# Patient Record
Sex: MALE | Race: BLACK OR AFRICAN AMERICAN | NOT HISPANIC OR LATINO | Employment: FULL TIME | URBAN - METROPOLITAN AREA
[De-identification: names, ages, dates, MRNs, and addresses within clinical notes are randomized per-mention and may not be internally consistent; named-entity substitution may affect disease eponyms.]

---

## 2017-06-01 ENCOUNTER — OFFICE VISIT (OUTPATIENT)
Dept: FAMILY MEDICINE | Facility: CLINIC | Age: 47
End: 2017-06-01
Payer: COMMERCIAL

## 2017-06-01 VITALS
OXYGEN SATURATION: 97 % | DIASTOLIC BLOOD PRESSURE: 80 MMHG | RESPIRATION RATE: 16 BRPM | HEIGHT: 76 IN | WEIGHT: 239.19 LBS | SYSTOLIC BLOOD PRESSURE: 150 MMHG | HEART RATE: 85 BPM | TEMPERATURE: 98 F | BODY MASS INDEX: 29.13 KG/M2

## 2017-06-01 DIAGNOSIS — I10 HYPERTENSION, UNCONTROLLED: ICD-10-CM

## 2017-06-01 DIAGNOSIS — M10.9 GOUT, UNSPECIFIED CAUSE, UNSPECIFIED CHRONICITY, UNSPECIFIED SITE: ICD-10-CM

## 2017-06-01 DIAGNOSIS — K43.9 EPIGASTRIC HERNIA: Primary | ICD-10-CM

## 2017-06-01 PROCEDURE — 99999 PR PBB SHADOW E&M-EST. PATIENT-LVL III: CPT | Mod: PBBFAC,,, | Performed by: PHYSICIAN ASSISTANT

## 2017-06-01 PROCEDURE — 99214 OFFICE O/P EST MOD 30 MIN: CPT | Mod: S$GLB,,, | Performed by: PHYSICIAN ASSISTANT

## 2017-06-01 RX ORDER — ALLOPURINOL 300 MG/1
300 TABLET ORAL DAILY
Qty: 90 TABLET | Refills: 0 | Status: SHIPPED | OUTPATIENT
Start: 2017-06-01 | End: 2017-07-24 | Stop reason: SDUPTHER

## 2017-06-01 RX ORDER — LISINOPRIL 10 MG/1
10 TABLET ORAL DAILY
Qty: 30 TABLET | Refills: 0 | Status: SHIPPED | OUTPATIENT
Start: 2017-06-01 | End: 2017-07-24

## 2017-06-01 NOTE — PROGRESS NOTES
Subjective:       Patient ID: Parminder Gregorio is a 46 y.o. male.    Chief Complaint: Gout (medication refill allopurinol) and Hernia (pt would like to get second opinion)    HPI: 47 yo male presents for abdominal mass. Noticed over the past week when working out and doing crunches in the upper mid abdomen. Goes away after the exercise. No pain, no trouble with digestion. Normal BMs.     Social History     Social History    Marital status:      Spouse name: N/A    Number of children: N/A    Years of education: N/A     Occupational History    Not on file.     Social History Main Topics    Smoking status: Never Smoker    Smokeless tobacco: Not on file    Alcohol use Yes      Comment: Socially    Drug use: No    Sexual activity: Yes     Partners: Female     Birth control/ protection: None      Comment: 6/1/17  with same partner 27 years     Other Topics Concern    Not on file     Social History Narrative    No narrative on file       Review of Systems   Gastrointestinal: Negative for abdominal pain, constipation and diarrhea.        Abdominal mass       Objective:      Physical Exam   Constitutional: He is oriented to person, place, and time. He appears well-developed and well-nourished. No distress.   HENT:   Head: Normocephalic and atraumatic.   Cardiovascular: Normal rate and regular rhythm.    Pulmonary/Chest: Effort normal and breath sounds normal.   Abdominal: Soft. Normal appearance and bowel sounds are normal. There is no tenderness.       Neurological: He is alert and oriented to person, place, and time.   Skin: He is not diaphoretic.   Vitals reviewed.      Assessment:       1. Epigastric hernia    2. Hypertension, uncontrolled    3. Gout, unspecified cause, unspecified chronicity, unspecified site        Plan:         Parminder was seen today for gout and hernia.    Diagnoses and all orders for this visit:    Epigastric hernia  -  Advised US to assess, pt wishes to hold off at this time.  -   He will f/u with PCP in 1 month for follow up    Hypertension, uncontrolled  -     lisinopril 10 MG tablet; Take 1 tablet (10 mg total) by mouth once daily.  -     Pt has been out of medication for about 3 months, will resume medication and follow up with PCP in 1 month to assess efficacy    Gout, unspecified cause, unspecified chronicity, unspecified site  -     allopurinol (ZYLOPRIM) 300 MG tablet; Take 1 tablet (300 mg total) by mouth once daily.

## 2017-07-24 ENCOUNTER — OFFICE VISIT (OUTPATIENT)
Dept: FAMILY MEDICINE | Facility: CLINIC | Age: 47
End: 2017-07-24
Payer: COMMERCIAL

## 2017-07-24 ENCOUNTER — LAB VISIT (OUTPATIENT)
Dept: LAB | Facility: HOSPITAL | Age: 47
End: 2017-07-24
Attending: FAMILY MEDICINE
Payer: COMMERCIAL

## 2017-07-24 VITALS
WEIGHT: 235 LBS | DIASTOLIC BLOOD PRESSURE: 80 MMHG | HEIGHT: 76 IN | HEART RATE: 76 BPM | RESPIRATION RATE: 16 BRPM | TEMPERATURE: 99 F | OXYGEN SATURATION: 98 % | BODY MASS INDEX: 28.62 KG/M2 | SYSTOLIC BLOOD PRESSURE: 138 MMHG

## 2017-07-24 DIAGNOSIS — M1A.09X1 IDIOPATHIC CHRONIC GOUT OF MULTIPLE SITES WITH TOPHUS: Primary | ICD-10-CM

## 2017-07-24 DIAGNOSIS — R73.03 PREDIABETES: ICD-10-CM

## 2017-07-24 DIAGNOSIS — I10 HYPERTENSION, UNCONTROLLED: ICD-10-CM

## 2017-07-24 PROCEDURE — 36415 COLL VENOUS BLD VENIPUNCTURE: CPT | Mod: PO

## 2017-07-24 PROCEDURE — 99214 OFFICE O/P EST MOD 30 MIN: CPT | Mod: S$GLB,,, | Performed by: FAMILY MEDICINE

## 2017-07-24 PROCEDURE — 83036 HEMOGLOBIN GLYCOSYLATED A1C: CPT

## 2017-07-24 PROCEDURE — 99999 PR PBB SHADOW E&M-EST. PATIENT-LVL III: CPT | Mod: PBBFAC,,, | Performed by: FAMILY MEDICINE

## 2017-07-24 RX ORDER — ALLOPURINOL 300 MG/1
300 TABLET ORAL DAILY
Qty: 90 TABLET | Refills: 0 | Status: SHIPPED | OUTPATIENT
Start: 2017-07-24 | End: 2017-12-01 | Stop reason: SDUPTHER

## 2017-07-24 RX ORDER — LISINOPRIL 10 MG/1
10 TABLET ORAL DAILY
Qty: 90 TABLET | Refills: 0 | Status: SHIPPED | OUTPATIENT
Start: 2017-07-24 | End: 2019-08-22

## 2017-07-24 NOTE — PROGRESS NOTES
Subjective:       Patient ID: Parminder Gregorio is a 46 y.o. male.    Chief Complaint: Hypertension (follow up )    HPI    HTN F/u:    Adherence with meds? Yes - lately  Home BP range: unsure - some in 140s, none in 150s.   Side effects: No  Following a low salt diet ? No  Current exercise? Yes  Need of refills? Yes  Recent changes in blood pressure medication: No  Patient has been in pain or taking decongestants/anti-inflammatory/OCP/SSRI medication: no  Patient has other symptoms (headache, weakness,  blurry vision, chest pain, palpitations, etc): no    Gout - pt is doing well on allopurinol. He is adherent and is not experiencing any side effects.    Prediabetes - Pt has been exercising and reducing his carb intake. Due for a1c today.    Current Outpatient Prescriptions on File Prior to Visit   Medication Sig Dispense Refill    [DISCONTINUED] allopurinol (ZYLOPRIM) 300 MG tablet Take 1 tablet (300 mg total) by mouth once daily. 90 tablet 0    [DISCONTINUED] lisinopril 10 MG tablet Take 1 tablet (10 mg total) by mouth once daily. 30 tablet 0     No current facility-administered medications on file prior to visit.        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 He uses smokeless tobacco. He reports that he drinks alcohol. He reports that he does not use drugs.    Review of Systems  see hpi  Objective:     Vitals:    07/24/17 0910   BP: 138/80   Pulse: 76   Resp: 16   Temp: 98.6 °F (37 °C)        Physical Exam   Constitutional: He appears well-developed. No distress.   HENT:   Head: Normocephalic and atraumatic.   Eyes: Conjunctivae and EOM are normal.   Cardiovascular: Normal rate and regular rhythm.  Exam reveals no gallop and no  friction rub.    No murmur heard.  Pulmonary/Chest: Effort normal and breath sounds normal. No respiratory distress. He has no wheezes. He has no rales. He exhibits no tenderness.   Abdominal: Soft. He exhibits no distension.   Diastasis recti   Musculoskeletal: He exhibits no edema.   No gross extremity deformity   Neurological: He is alert.   Psychiatric: He has a normal mood and affect. His behavior is normal.   Nursing note and vitals reviewed.      Assessment:       1. Idiopathic chronic gout of multiple sites with tophus    2. Prediabetes    3. Hypertension, uncontrolled        Plan:       Parminder was seen today for hypertension.    Diagnoses and all orders for this visit:    Acute idiopathic gout of multiple sites  - Chronic - stable     Pt is doing well on current therapy and is requesting a refill. No side effects noted. Will continue current therapy.    -     allopurinol (ZYLOPRIM) 300 MG tablet; Take 1 tablet (300 mg total) by mouth once daily.    Prediabetes  -     Hemoglobin A1c; Future  Chronic - will check a1c today. Pt has been watching his starch intake.    Hypertension, uncontrolled  -     lisinopril 10 MG tablet; Take 1 tablet (10 mg total) by mouth once daily.    Advised pt to monitor their blood pressure and notify me if they have BPs repeatedly over 140/90, or if they have concerning sxs such as chest pain, lightheadedness or palpitations.          Return in about 3 months (around 10/24/2017) for Hypertension.        Pt verbalized understanding and agreed with our plan.

## 2017-07-25 PROBLEM — R73.03 PREDIABETES: Status: ACTIVE | Noted: 2017-07-25

## 2017-07-25 LAB
ESTIMATED AVG GLUCOSE: 134 MG/DL
HBA1C MFR BLD HPLC: 6.3 %

## 2017-07-26 ENCOUNTER — TELEPHONE (OUTPATIENT)
Dept: FAMILY MEDICINE | Facility: CLINIC | Age: 47
End: 2017-07-26

## 2017-07-26 NOTE — TELEPHONE ENCOUNTER
----- Message from Jeffrey Brown MD sent at 7/25/2017  2:05 PM CDT -----  Please notify patient results are abnormal as his prediabetes is worsening.  Please have him reduce his starch/carb intake and increase his exercise or he will require medication. If he has additional questions or concerns, please schedule a f/u.

## 2017-12-01 DIAGNOSIS — M1A.09X1 IDIOPATHIC CHRONIC GOUT OF MULTIPLE SITES WITH TOPHUS: ICD-10-CM

## 2017-12-01 RX ORDER — ALLOPURINOL 300 MG/1
300 TABLET ORAL DAILY
Qty: 90 TABLET | Refills: 3 | Status: SHIPPED | OUTPATIENT
Start: 2017-12-01 | End: 2019-01-14 | Stop reason: SDUPTHER

## 2019-01-14 DIAGNOSIS — M1A.09X1 IDIOPATHIC CHRONIC GOUT OF MULTIPLE SITES WITH TOPHUS: ICD-10-CM

## 2019-01-14 RX ORDER — ALLOPURINOL 300 MG/1
300 TABLET ORAL DAILY
Qty: 30 TABLET | Refills: 0 | Status: SHIPPED | OUTPATIENT
Start: 2019-01-14 | End: 2019-08-22

## 2019-01-14 NOTE — TELEPHONE ENCOUNTER
Please call patient and let them know that their medication was filled, but they are do for a follow up appointment. Please help them schedule.

## 2019-05-23 ENCOUNTER — TELEPHONE (OUTPATIENT)
Dept: FAMILY MEDICINE | Facility: CLINIC | Age: 49
End: 2019-05-23

## 2019-08-22 ENCOUNTER — OFFICE VISIT (OUTPATIENT)
Dept: FAMILY MEDICINE | Facility: CLINIC | Age: 49
End: 2019-08-22
Payer: COMMERCIAL

## 2019-08-22 VITALS
HEIGHT: 76 IN | HEART RATE: 104 BPM | DIASTOLIC BLOOD PRESSURE: 96 MMHG | OXYGEN SATURATION: 97 % | SYSTOLIC BLOOD PRESSURE: 154 MMHG | WEIGHT: 240.31 LBS | TEMPERATURE: 99 F | BODY MASS INDEX: 29.26 KG/M2

## 2019-08-22 DIAGNOSIS — I10 HYPERTENSION, UNCONTROLLED: ICD-10-CM

## 2019-08-22 DIAGNOSIS — B96.89 BACTERIAL SINUSITIS: Primary | ICD-10-CM

## 2019-08-22 DIAGNOSIS — J32.9 BACTERIAL SINUSITIS: Primary | ICD-10-CM

## 2019-08-22 PROCEDURE — 99999 PR PBB SHADOW E&M-EST. PATIENT-LVL III: ICD-10-PCS | Mod: PBBFAC,,, | Performed by: PHYSICIAN ASSISTANT

## 2019-08-22 PROCEDURE — 99214 PR OFFICE/OUTPT VISIT, EST, LEVL IV, 30-39 MIN: ICD-10-PCS | Mod: 25,S$GLB,, | Performed by: PHYSICIAN ASSISTANT

## 2019-08-22 PROCEDURE — 96372 THER/PROPH/DIAG INJ SC/IM: CPT | Mod: S$GLB,,, | Performed by: PHYSICIAN ASSISTANT

## 2019-08-22 PROCEDURE — 96372 PR INJECTION,THERAP/PROPH/DIAG2ST, IM OR SUBCUT: ICD-10-PCS | Mod: S$GLB,,, | Performed by: PHYSICIAN ASSISTANT

## 2019-08-22 PROCEDURE — 99999 PR PBB SHADOW E&M-EST. PATIENT-LVL III: CPT | Mod: PBBFAC,,, | Performed by: PHYSICIAN ASSISTANT

## 2019-08-22 PROCEDURE — 99214 OFFICE O/P EST MOD 30 MIN: CPT | Mod: 25,S$GLB,, | Performed by: PHYSICIAN ASSISTANT

## 2019-08-22 RX ORDER — LEVOFLOXACIN 500 MG/1
500 TABLET, FILM COATED ORAL DAILY
Qty: 5 TABLET | Refills: 0 | Status: SHIPPED | OUTPATIENT
Start: 2019-08-22 | End: 2019-08-27

## 2019-08-22 RX ORDER — FLUTICASONE PROPIONATE 50 MCG
1 SPRAY, SUSPENSION (ML) NASAL 2 TIMES DAILY
Qty: 16 G | Refills: 0 | Status: SHIPPED | OUTPATIENT
Start: 2019-08-22 | End: 2020-07-08

## 2019-08-22 RX ORDER — PROMETHAZINE HYDROCHLORIDE AND DEXTROMETHORPHAN HYDROBROMIDE 6.25; 15 MG/5ML; MG/5ML
5 SYRUP ORAL EVERY 8 HOURS PRN
Qty: 180 ML | Refills: 0 | Status: SHIPPED | OUTPATIENT
Start: 2019-08-22 | End: 2019-09-01

## 2019-08-22 NOTE — PROGRESS NOTES
Subjective:       Patient ID: Parminder Gregorio is a 48 y.o. male.    Chief Complaint: Sinus Problem and Headache    Sinus Problem   This is a new problem. The current episode started 1 to 4 weeks ago. The problem has been gradually worsening since onset. There has been no fever. Associated symptoms include congestion, coughing ( mucous), headaches and sinus pressure. Pertinent negatives include no ear pain or sore throat.     Social History     Socioeconomic History    Marital status:      Spouse name: Not on file    Number of children: Not on file    Years of education: Not on file    Highest education level: Not on file   Occupational History    Not on file   Social Needs    Financial resource strain: Not on file    Food insecurity:     Worry: Not on file     Inability: Not on file    Transportation needs:     Medical: Not on file     Non-medical: Not on file   Tobacco Use    Smoking status: Never Smoker    Smokeless tobacco: Current User   Substance and Sexual Activity    Alcohol use: Yes     Comment: Socially    Drug use: No    Sexual activity: Yes     Partners: Female     Birth control/protection: None     Comment: 6/1/17  with same partner 27 years   Lifestyle    Physical activity:     Days per week: Not on file     Minutes per session: Not on file    Stress: Not on file   Relationships    Social connections:     Talks on phone: Not on file     Gets together: Not on file     Attends [employer]oration[employer] service: Not on file     Active member of club or organization: Not on file     Attends meetings of clubs or organizations: Not on file     Relationship status: Not on file   Other Topics Concern    Not on file   Social History Narrative    Not on file       Review of Systems   Constitutional: Positive for fever.   HENT: Positive for congestion, postnasal drip and sinus pressure. Negative for ear pain and sore throat.    Respiratory: Positive for cough ( mucous).    Neurological: Positive for  headaches.       Objective:      Physical Exam   Constitutional: He is oriented to person, place, and time. He appears well-developed and well-nourished.   HENT:   Head: Normocephalic and atraumatic.   Right Ear: Tympanic membrane and ear canal normal.   Left Ear: Tympanic membrane and ear canal normal.   Nose: Mucosal edema and rhinorrhea present. Right sinus exhibits maxillary sinus tenderness. Left sinus exhibits maxillary sinus tenderness.   Mouth/Throat: No oropharyngeal exudate or posterior oropharyngeal edema.   Neurological: He is alert and oriented to person, place, and time.   Skin: Skin is warm and dry.   Psychiatric: He has a normal mood and affect. His behavior is normal.   Vitals reviewed.      Assessment:       1. Bacterial sinusitis    2. Hypertension, uncontrolled        Plan:         Parminder was seen today for sinus problem and headache.    Diagnoses and all orders for this visit:    Bacterial sinusitis  -     levoFLOXacin (LEVAQUIN) 500 MG tablet; Take 1 tablet (500 mg total) by mouth once daily. for 5 days  -     fluticasone propionate (FLONASE) 50 mcg/actuation nasal spray; 1 spray (50 mcg total) by Each Nostril route 2 (two) times daily.  -     methylPREDNISolone sod suc(PF) injection 125 mg  -     promethazine-dextromethorphan (PROMETHAZINE-DM) 6.25-15 mg/5 mL Syrp; Take 5 mLs by mouth every 8 (eight) hours as needed.    Hypertension, uncontrolled        -     Pt on no meds, states monitors regularly at home. Well controlled. Nurse visit in 1 week

## 2020-01-21 ENCOUNTER — OFFICE VISIT (OUTPATIENT)
Dept: URGENT CARE | Facility: CLINIC | Age: 50
End: 2020-01-21
Payer: COMMERCIAL

## 2020-01-21 VITALS
RESPIRATION RATE: 18 BRPM | HEART RATE: 91 BPM | OXYGEN SATURATION: 99 % | TEMPERATURE: 98 F | DIASTOLIC BLOOD PRESSURE: 89 MMHG | BODY MASS INDEX: 29.22 KG/M2 | SYSTOLIC BLOOD PRESSURE: 145 MMHG | HEIGHT: 76 IN | WEIGHT: 240 LBS

## 2020-01-21 DIAGNOSIS — L02.91 ABSCESS: Primary | ICD-10-CM

## 2020-01-21 PROCEDURE — 99214 PR OFFICE/OUTPT VISIT, EST, LEVL IV, 30-39 MIN: ICD-10-PCS | Mod: 25,S$GLB,, | Performed by: FAMILY MEDICINE

## 2020-01-21 PROCEDURE — 10060 I&D ABSCESS SIMPLE/SINGLE: CPT | Mod: S$GLB,,, | Performed by: FAMILY MEDICINE

## 2020-01-21 PROCEDURE — 99214 OFFICE O/P EST MOD 30 MIN: CPT | Mod: 25,S$GLB,, | Performed by: FAMILY MEDICINE

## 2020-01-21 PROCEDURE — 10060 INCISION & DRAINAGE: ICD-10-PCS | Mod: S$GLB,,, | Performed by: FAMILY MEDICINE

## 2020-01-21 RX ORDER — CLINDAMYCIN HYDROCHLORIDE 300 MG/1
300 CAPSULE ORAL 3 TIMES DAILY
Qty: 30 CAPSULE | Refills: 0 | Status: SHIPPED | OUTPATIENT
Start: 2020-01-21 | End: 2020-01-31

## 2020-01-21 NOTE — PROCEDURES
"Incision & Drainage  Date/Time: 1/21/2020 1:45 PM  Performed by: Lv Obregon MD  Authorized by: Lv Obregon MD     Time out: Immediately prior to procedure a "time out" was called to verify the correct patient, procedure, equipment, support staff and site/side marked as required.    Consent Done?:  Yes (Verbal)    Type:  Abscess  Body area:  Head/neck  Location details:  Face  Anesthesia:  Local infiltration  Local anesthetic: lidocaine 1% without epinephrine  Anesthetic total (ml):  3  Scalpel size:  11  Incision type:  Single straight  Complexity:  Simple  Drainage:  Bloody  Drainage amount:  Scant  Wound treatment:  Wound left open  Packing material:  None  Patient tolerance:  Patient tolerated the procedure well with no immediate complications      "

## 2020-01-21 NOTE — PATIENT INSTRUCTIONS
PLEASE READ YOUR DISCHARGE INSTRUCTIONS ENTIRELY AS IT CONTAINS IMPORTANT INFORMATION.    Please return here or go to the Emergency Department for any concerns or worsening of condition.    If you were prescribed antibiotics, please take them to completion.      Please return here in 1-3 days for a recheck of your wound.    If not allergic, please take over the counter Tylenol (Acetaminophen) and/or Motrin (Ibuprofen) as directed for control of pain and/or fever.  Please follow up with your primary care doctor or specialist as needed.  Soak wound as discussed and apply warm compresses frequently    If you smoke, please stop smoking.    Please return or see your primary care doctor if you develop new or worsening symptoms.     Please arrange follow up with your primary medical clinic as soon as possible. You must understand that you've received an Urgent Care treatment only and that you may be released before all of your medical problems are known or treated. You, the patient, will arrange for follow up as instructed. If your symptoms worsen or fail to improve you should go to the Emergency Room.  Abscess (Incision & Drainage)  An abscess is sometimes called a boil. It happens when bacteria get trapped under the skin and start to grow. Pus forms inside the abscess as the body responds to the bacteria. An abscess can happen with an insect bite, ingrown hair, blocked oil gland, pimple, cyst, or puncture wound.  Your healthcare provider has drained the pus from your abscess. If the abscess pocket was large, your healthcare provider may have put in gauze packing. Your provider will need to remove it on your next visit. He or she may also replace it at that time. You may not need antibiotics to treat a simple abscess, unless the infection is spreading into the skin around the wound (cellulitis).  The wound will take about 1 to 2 weeks to heal, depending on the size of the abscess. Healthy tissue will grow from the bottom  and sides of the opening until it seals over.  Home care  These tips can help your wound heal:  · The wound may drain for the first 2 days. Cover the wound with a clean dry dressing. Change the dressing if it becomes soaked with blood or pus.  · If a gauze packing was placed inside the abscess pocket, you may be told to remove it yourself. You may do this in the shower. Once the packing is removed, you should wash the area in the shower, or clean the area as directed by your provider. Continue to do this until the skin opening has closed. Make sure you wash your hands after changing the packing or cleaning the wound.  · If you were prescribed antibiotics, take them as directed until they are all gone.  · You may use acetaminophen or ibuprofen to control pain, unless another pain medicine was prescribed. If you have liver disease or ever had a stomach ulcer, talk with your doctor before using these medicines.  Follow-up care  Follow up with your healthcare provider, or as advised. If a gauze packing was put in your wound, it should be removed in 1 to 2 days. Check your wound every day for any signs that the infection is getting worse. The signs are listed below.  When to seek medical advice  Call your healthcare provider right away if any of these occur:  · Increasing redness or swelling  · Red streaks in the skin leading away from the wound  · Increasing local pain or swelling  · Continued pus draining from the wound 2 days after treatment  · Fever of 100.4ºF (38ºC) or higher, or as directed by your healthcare provider  · Boil returns when you are at home  Date Last Reviewed: 9/1/2016  © 7985-0810 [employer]. [address] [place] [address], [place] [address]. All rights reserved. This information is not intended as a substitute for professional medical care. Always follow your healthcare professional's instructions.        Abscess (Antibiotic Treatment Only)  An abscess (sometimes called a boil) happens  when bacteria get trapped under the skin and start to grow. Pus forms inside the abscess as the body responds to the bacteria. An abscess can happen with an insect bite, ingrown hair, blocked oil gland, pimple, cyst, or puncture wound.  In the early stages, your wound may be red and tender. For this stage, you may get antibiotics. If the abscess does not get better with antibiotics, it will need to be drained with a small cut.  Home care  These tips will help you care for your abscess at home:  · Soak the wound in hot water or apply hot packs (small towel soaked in hot water) to the area for 20 minutes at a time. Do this 3 to 4 times a day.  · Do not cut, squeeze, or pop the boil yourself.  · Apply antibiotic cream or ointment to the skin 3 to 4 times a day, unless something else was prescribed. Some ointments include an antibiotic plus a pain reliever.  · If your doctor prescribed antibiotics, do not stop taking them until you have finished the medicine or the doctor tells you to stop.  · You may use an over-the-counter pain medicine to control pain, unless another pain medicine was prescribed. If you have chronic liver or kidney disease or ever had a stomach ulcer or gastrointestinal bleeding, talk with your doctor before using these any of these.  Follow-up care  Follow up with your healthcare provider, or as advised. Check your wound each day for the signs of worsening infection listed below.  When to seek medical advice  Get prompt medical attention if any of these occur:  · An increase in redness or swelling  · Red streaks in the skin leading away from the abscess  · An increase in local pain or swelling  · Fever of 100.4ºF (38ºC) or higher, or as directed by your healthcare provider  · Pus or fluid coming from the abscess  · Boil returns after getting better  Date Last Reviewed: 9/1/2016  © 5822-9151 [employer]. [address] [place] [address], [place] [address]. All rights reserved. This information is  not intended as a substitute for professional medical care. Always follow your healthcare professional's instructions.

## 2020-01-21 NOTE — PROGRESS NOTES
"Subjective:       Patient ID: Parminder Gregorio is a 49 y.o. male.    Vitals:  height is 6' 4" (1.93 m) and weight is 108.9 kg (240 lb). His oral temperature is 97.8 °F (36.6 °C). His blood pressure is 145/89 (abnormal) and his pulse is 91. His respiration is 18 and oxygen saturation is 99%.     Chief Complaint: Abscess    Patient came with right sided facial swelling for last 4 days.  Reports the size has been getting bigger gradually.  Complained of pain only on touching.  Denies fever, chills, headache, weakness.    Abscess   Chronicity:  NewProgression Since Onset: gradually worsening  Location:  Face  Associated Symptoms: no fever, no chills  Characteristics: painful and swelling    Pain Scale:  4/10      Constitution: Negative. Negative for chills and fever.   HENT: Positive for facial swelling. Negative for sore throat.    Neck: Negative for painful lymph nodes.   Eyes: Negative.  Negative for eye itching and eyelid swelling.   Respiratory: Negative.  Negative for cough.    Musculoskeletal: Negative for joint pain and joint swelling.   Skin: Positive for rash and abscess. Negative for color change, pale, wound, abrasion, laceration, lesion, skin thickening/induration, puncture wound, erythema, avulsion and hives.   Allergic/Immunologic: Positive for itching. Negative for environmental allergies, immunocompromised state and hives.   Hematologic/Lymphatic: Negative for swollen lymph nodes.       Objective:      Physical Exam   Constitutional: He is oriented to person, place, and time. He appears well-developed and well-nourished. He is cooperative.  Non-toxic appearance. He does not appear ill. No distress.   HENT:   Head: Normocephalic and atraumatic.   Right Ear: Hearing, tympanic membrane, external ear and ear canal normal.   Left Ear: Hearing, tympanic membrane, external ear and ear canal normal.   Nose: Nose normal. No mucosal edema, rhinorrhea or nasal deformity. No epistaxis. Right sinus exhibits no maxillary " sinus tenderness and no frontal sinus tenderness. Left sinus exhibits no maxillary sinus tenderness and no frontal sinus tenderness.   Mouth/Throat: Uvula is midline, oropharynx is clear and moist and mucous membranes are normal. No trismus in the jaw. Normal dentition. No uvula swelling. No posterior oropharyngeal erythema.   Eyes: Conjunctivae and lids are normal. Right eye exhibits no discharge. Left eye exhibits no discharge. No scleral icterus.   Neck: Trachea normal, normal range of motion, full passive range of motion without pain and phonation normal. Neck supple.   Cardiovascular: Normal rate, regular rhythm, normal heart sounds, intact distal pulses and normal pulses.   Pulmonary/Chest: Effort normal and breath sounds normal. No respiratory distress.   Abdominal: Soft. Normal appearance and bowel sounds are normal. He exhibits no distension, no pulsatile midline mass and no mass. There is no tenderness.   Musculoskeletal: Normal range of motion. He exhibits no edema or deformity.   Neurological: He is alert and oriented to person, place, and time. He exhibits normal muscle tone. Coordination normal.   Skin: Skin is warm, dry, intact, not diaphoretic and not pale.   Right jaw:  Positive about 4-5 cm round induration.  Positive small area of fluctuation in the center.  Positive erythema and warmth.  Positive localized tenderness. No discharge. No swollen lymph glands. erythema  Psychiatric: He has a normal mood and affect. His speech is normal and behavior is normal. Judgment and thought content normal. Cognition and memory are normal.   Nursing note and vitals reviewed.        Assessment:       1. Abscess        Plan:         Abscess  -     Incision & Drainage    Other orders  -     clindamycin (CLEOCIN) 300 MG capsule; Take 1 capsule (300 mg total) by mouth 3 (three) times daily. for 10 days  Dispense: 30 capsule; Refill: 0        PLEASE READ YOUR DISCHARGE INSTRUCTIONS ENTIRELY AS IT CONTAINS IMPORTANT  INFORMATION.    Please return here or go to the Emergency Department for any concerns or worsening of condition.    If you were prescribed antibiotics, please take them to completion.      Please return here in 1-3 days for a recheck of your wound.    If not allergic, please take over the counter Tylenol (Acetaminophen) and/or Motrin (Ibuprofen) as directed for control of pain and/or fever.  Please follow up with your primary care doctor or specialist as needed.  Soak wound as discussed and apply warm compresses frequently    If you smoke, please stop smoking.    Please return or see your primary care doctor if you develop new or worsening symptoms.     Please arrange follow up with your primary medical clinic as soon as possible. You must understand that you've received an Urgent Care treatment only and that you may be released before all of your medical problems are known or treated. You, the patient, will arrange for follow up as instructed. If your symptoms worsen or fail to improve you should go to the Emergency Room.  Abscess (Incision & Drainage)  An abscess is sometimes called a boil. It happens when bacteria get trapped under the skin and start to grow. Pus forms inside the abscess as the body responds to the bacteria. An abscess can happen with an insect bite, ingrown hair, blocked oil gland, pimple, cyst, or puncture wound.  Your healthcare provider has drained the pus from your abscess. If the abscess pocket was large, your healthcare provider may have put in gauze packing. Your provider will need to remove it on your next visit. He or she may also replace it at that time. You may not need antibiotics to treat a simple abscess, unless the infection is spreading into the skin around the wound (cellulitis).  The wound will take about 1 to 2 weeks to heal, depending on the size of the abscess. Healthy tissue will grow from the bottom and sides of the opening until it seals over.  Home care  These tips can  help your wound heal:  · The wound may drain for the first 2 days. Cover the wound with a clean dry dressing. Change the dressing if it becomes soaked with blood or pus.  · If a gauze packing was placed inside the abscess pocket, you may be told to remove it yourself. You may do this in the shower. Once the packing is removed, you should wash the area in the shower, or clean the area as directed by your provider. Continue to do this until the skin opening has closed. Make sure you wash your hands after changing the packing or cleaning the wound.  · If you were prescribed antibiotics, take them as directed until they are all gone.  · You may use acetaminophen or ibuprofen to control pain, unless another pain medicine was prescribed. If you have liver disease or ever had a stomach ulcer, talk with your doctor before using these medicines.  Follow-up care  Follow up with your healthcare provider, or as advised. If a gauze packing was put in your wound, it should be removed in 1 to 2 days. Check your wound every day for any signs that the infection is getting worse. The signs are listed below.  When to seek medical advice  Call your healthcare provider right away if any of these occur:  · Increasing redness or swelling  · Red streaks in the skin leading away from the wound  · Increasing local pain or swelling  · Continued pus draining from the wound 2 days after treatment  · Fever of 100.4ºF (38ºC) or higher, or as directed by your healthcare provider  · Boil returns when you are at home  Date Last Reviewed: 9/1/2016  © 0974-1472 [employer]. [address] [place] [address], [place] [address]. All rights reserved. This information is not intended as a substitute for professional medical care. Always follow your healthcare professional's instructions.        Abscess (Antibiotic Treatment Only)  An abscess (sometimes called a boil) happens when bacteria get trapped under the skin and start to grow. Pus forms inside  the abscess as the body responds to the bacteria. An abscess can happen with an insect bite, ingrown hair, blocked oil gland, pimple, cyst, or puncture wound.  In the early stages, your wound may be red and tender. For this stage, you may get antibiotics. If the abscess does not get better with antibiotics, it will need to be drained with a small cut.  Home care  These tips will help you care for your abscess at home:  · Soak the wound in hot water or apply hot packs (small towel soaked in hot water) to the area for 20 minutes at a time. Do this 3 to 4 times a day.  · Do not cut, squeeze, or pop the boil yourself.  · Apply antibiotic cream or ointment to the skin 3 to 4 times a day, unless something else was prescribed. Some ointments include an antibiotic plus a pain reliever.  · If your doctor prescribed antibiotics, do not stop taking them until you have finished the medicine or the doctor tells you to stop.  · You may use an over-the-counter pain medicine to control pain, unless another pain medicine was prescribed. If you have chronic liver or kidney disease or ever had a stomach ulcer or gastrointestinal bleeding, talk with your doctor before using these any of these.  Follow-up care  Follow up with your healthcare provider, or as advised. Check your wound each day for the signs of worsening infection listed below.  When to seek medical advice  Get prompt medical attention if any of these occur:  · An increase in redness or swelling  · Red streaks in the skin leading away from the abscess  · An increase in local pain or swelling  · Fever of 100.4ºF (38ºC) or higher, or as directed by your healthcare provider  · Pus or fluid coming from the abscess  · Boil returns after getting better  Date Last Reviewed: 9/1/2016  © 0997-0008 [employer]. [address] [place] [address], [place] [address]. All rights reserved. This information is not intended as a substitute for professional medical care. Always follow  your healthcare professional's instructions.      Follow up with PCP within next 2-5 days.  If symptoms get worse, go to ER for further evaluation.

## 2020-01-24 ENCOUNTER — TELEPHONE (OUTPATIENT)
Dept: URGENT CARE | Facility: CLINIC | Age: 50
End: 2020-01-24

## 2020-01-25 NOTE — TELEPHONE ENCOUNTER
Spoke to patient and he stated his wound is healing well. He states its still swollen but its slowly go down. Advised patient if he had and questions or concerns to feel free to call us.

## 2020-04-28 ENCOUNTER — OFFICE VISIT (OUTPATIENT)
Dept: FAMILY MEDICINE | Facility: CLINIC | Age: 50
End: 2020-04-28
Payer: COMMERCIAL

## 2020-04-28 ENCOUNTER — TELEPHONE (OUTPATIENT)
Dept: FAMILY MEDICINE | Facility: CLINIC | Age: 50
End: 2020-04-28

## 2020-04-28 DIAGNOSIS — M1A.09X1 IDIOPATHIC CHRONIC GOUT OF MULTIPLE SITES WITH TOPHUS: ICD-10-CM

## 2020-04-28 DIAGNOSIS — I10 HYPERTENSION, UNCONTROLLED: ICD-10-CM

## 2020-04-28 DIAGNOSIS — M54.41 ACUTE MIDLINE LOW BACK PAIN WITH RIGHT-SIDED SCIATICA: Primary | ICD-10-CM

## 2020-04-28 DIAGNOSIS — R73.03 PREDIABETES: ICD-10-CM

## 2020-04-28 PROCEDURE — 99214 PR OFFICE/OUTPT VISIT, EST, LEVL IV, 30-39 MIN: ICD-10-PCS | Mod: 95,,, | Performed by: INTERNAL MEDICINE

## 2020-04-28 PROCEDURE — 99214 OFFICE O/P EST MOD 30 MIN: CPT | Mod: 95,,, | Performed by: INTERNAL MEDICINE

## 2020-04-28 RX ORDER — MELOXICAM 15 MG/1
15 TABLET ORAL DAILY PRN
Qty: 30 TABLET | Refills: 0 | Status: SHIPPED | OUTPATIENT
Start: 2020-04-28 | End: 2020-06-25 | Stop reason: SDUPTHER

## 2020-04-28 RX ORDER — GABAPENTIN 300 MG/1
300 CAPSULE ORAL DAILY PRN
Qty: 30 CAPSULE | Refills: 2 | Status: SHIPPED | OUTPATIENT
Start: 2020-04-28 | End: 2020-07-08

## 2020-04-28 NOTE — TELEPHONE ENCOUNTER
----- Message from Denisha Leopoldo sent at 4/28/2020  2:14 PM CDT -----  Contact: self  [phone]  .Type: Patient Call Back    Who called self     What is the request in detail: pt stated that he's still waiting for his rx from today's visit to be sent to  .  [facility] [address] [place], [place] - [address]  [address] [place] [address]  Phone: [phone] Fax: [phone]      Can the clinic reply by [facility]? Call back    Would the patient rather a call back or a response via [facility]?  Call back     Best call back number [phone]

## 2020-04-28 NOTE — PROGRESS NOTES
Patient ID: Parminder Gregorio is a pleasant 49 y.o. Black or  male.    Chief Complaint: Back Pain and Sciatica      Patient is a new pt to me but former pt from Dr. Brown. Last seen by him on 7/24/2017 but was seen by IVETTE Sullivan, on 8/22/2019 for bacterial sinusitis. See list of problems below.    This visit is a Telemedicine Video Visit due to the COVID-19 epidemics.    The patient location is: home  The chief complaint leading to consultation is: leg pain  Visit type: audiovisual  Total time spent with patient: 12 minutes  Each patient to whom he or she provides medical services by telemedicine is:  (1) informed of the relationship between the physician and patient and the respective role of any other health care provider with respect to management of the patient; and (2) notified that he or she may decline to receive medical services by telemedicine and may withdraw from such care at any time.    HPI     Mr. Gregorio reports that starting 9 days ago, he started to have pain in regard of his lower back, midline, that started with no initial trauma. However, he uses his stationary bike more than usually, did up to 16 miles.   The pain started to migrate in the R side and he thought it could be related to gout that he has been presenting at some point, in regard of the foot, no recent episode.   The pain moved then to his anterior thigh and lateral calf, and is worse when he de ambulates.   He took some NSAIDs that helped.  The pain does not interfere with his sleep but he does not really sleep on the R side.  He has no weakness or numbness of the leg.   Pain is stated to be fluctuating, no electric component.     Patient Active Problem List   Diagnosis    Secondary hyperparathyroidism of renal origin    Hypovitaminosis D    Hypertension, uncontrolled    Idiopathic chronic gout of multiple sites with tophus    Prediabetes    Acute midline low back pain with right-sided sciatica        Review of  Systems   Constitutional: Negative.    HENT: Negative.    Respiratory: Negative.    Cardiovascular: Negative.    Gastrointestinal: Negative.    Musculoskeletal: Positive for back pain.   All other systems reviewed and are negative.      Objective:      Physical Exam    There were no vitals filed for this visit.  There is no height or weight on file to calculate BMI.    Impossible to do due to virtual visit.    RESULTS: Reviewed labs from last 4 years.    Assessment:       1. Acute midline low back pain with right-sided sciatica    2. Idiopathic chronic gout of multiple sites with tophus    3. Prediabetes    4. Hypertension, uncontrolled        Plan:   Parminder was seen today for back pain and sciatica.    Diagnoses and all orders for this visit:    Acute midline low back pain with right-sided sciatica  -     gabapentin (NEURONTIN) 300 MG capsule; Take 1 capsule (300 mg total) by mouth daily as needed (for sciatic pain). Take in the evening. Careful to sleepiness    See HPI. Pt will try to take gabapentin in the evening, informed re: possible SE, he will resume NSAIDs on a full stomach (will order meloxicam), will alternate heating pads and ice, and try gentle stretching.  He was informed re: symptoms or signs to monitor.  If not doing better, will refer him to Orthopedics.     Idiopathic chronic gout of multiple sites with tophus    No recent flare. No control of blood work recently. Will check with next one.    Prediabetes    No f-up from 2018? Will assess.    Hypertension, uncontrolled    To be monitored, pt did not f-up. No treatment for now. Will organize a yearly physical exam.     No follow-ups on file.

## 2020-05-07 DIAGNOSIS — I10 HYPERTENSION, UNCONTROLLED: ICD-10-CM

## 2020-05-18 NOTE — PROGRESS NOTES
"Subjective:       Patient ID: Parminder Gregorio is a pleasant 49 y.o. Black or  male patient    Chief Complaint: Annual Exam      Patient is a pt I saw last on 04/28/2020 (Telemedicine) for lower back pain with sciatica. See my notes and list of problems below.    HPI     He comes today for a yearly physical.   He reports still having some lower back pain, but better, states it went forom "90 % of pain to 10 %". The pain does not go in the leg anymore.  He has not resumed exercising, but plans to start progressively.  He takes 1000 UI of vit D a day.      Patient Active Problem List   Diagnosis    Secondary hyperparathyroidism of renal origin    Hypovitaminosis D    Hypertension, uncontrolled    Idiopathic chronic gout of multiple sites with tophus    Prediabetes    Acute midline low back pain with right-sided sciatica          ACTIVE MEDICAL ISSUES:  Documented in Problem List     PAST MEDICAL HISTORY  Documented     PAST SURGICAL HISTORY:  Documented     SOCIAL HISTORY:  Documented     FAMILY HISTORY:  Documented     ALLERGIES AND MEDICATIONS: updated and reviewed.  Documented    Review of Systems   Constitutional: Negative for fever.   Cardiovascular: Negative for chest pain.   Gastrointestinal: Negative for abdominal pain.   Genitourinary: Negative for dysuria and hematuria.   Musculoskeletal: Positive for back pain.   Neurological: Negative for weakness, numbness and headaches.       Objective:      Physical Exam   Constitutional: He is oriented to person, place, and time. He appears well-developed and well-nourished.   HENT:   Head: Normocephalic and atraumatic.   Right Ear: External ear normal.   Left Ear: External ear normal.   Mouth/Throat: Oropharynx is clear and moist.   Eyes: Pupils are equal, round, and reactive to light. Conjunctivae and EOM are normal.   Neck: Normal range of motion. Neck supple. No thyromegaly present.   Cardiovascular: Normal rate, regular rhythm, normal heart " "sounds and intact distal pulses.   Pulmonary/Chest: Effort normal and breath sounds normal.   Abdominal: Soft. Bowel sounds are normal.   Musculoskeletal: Normal range of motion.   Neurological: He is alert and oriented to person, place, and time. No cranial nerve deficit or sensory deficit. He exhibits normal muscle tone.   Skin: Skin is warm and dry.   Nursing note and vitals reviewed.      Vitals:    05/19/20 1128   BP: (!) 152/90   BP Location: Right arm   Patient Position: Sitting   BP Method: Large (Automatic)   Pulse: 94   Resp: 16   Temp: 98.9 °F (37.2 °C)   TempSrc: Oral   SpO2: 97%   Weight: 111.3 kg (245 lb 6 oz)   Height: 6' 4" (1.93 m)     Body mass index is 29.87 kg/m².    RESULTS: Reviewed labs from last 3 years    Assessment:       1. Annual physical exam    2. Hypertension, uncontrolled    3. Prediabetes    4. Hyperparathyroidism    5. Vitamin D deficiency    6. Gout, unspecified cause, unspecified chronicity, unspecified site    7. Screening for malignant neoplasm of prostate    8. Colon cancer screening        Plan:   Parminder was seen today for annual exam.    Diagnoses and all orders for this visit:    Annual physical exam  -     TSH; Future  -     Lipid Panel; Future     Will check lab work. Pt due for colono and PSA. Will assess if due for td at next visit next week.    Hypertension, uncontrolled  -     CBC auto differential; Future  -     Comprehensive metabolic panel; Future    Not at goal today, but pt stressed out, I will recheck at next visit. Advised re: salt, and need to exercise. Will recheck at next visit.    Prediabetes  -     Hemoglobin A1C; Future    Will monitor. Was told once he may need metformin.    Hyperparathyroidism  -     PTH, intact; Future    Will recheck. Was due to vit D deficiency?    Vitamin D deficiency  -     Vitamin D; Future    Will check level.    Gout, unspecified cause, unspecified chronicity, unspecified site  -     Uric acid; Future    No recent flare. Will " check lab work.     Screening for malignant neoplasm of prostate  -     PSA, Screening; Future    Will check PSA.    Colon cancer screening  -     Ambulatory referral/consult to Gastroenterology; Future    Is AA, so may benefit of colonoscopy from age 45. No family Hx or symptoms. He prefers colono to FIT or Cologard.    Follow up in about 1 week (around 5/26/2020) for f-up lab work.

## 2020-05-19 ENCOUNTER — OFFICE VISIT (OUTPATIENT)
Dept: FAMILY MEDICINE | Facility: CLINIC | Age: 50
End: 2020-05-19
Payer: COMMERCIAL

## 2020-05-19 ENCOUNTER — LAB VISIT (OUTPATIENT)
Dept: LAB | Facility: HOSPITAL | Age: 50
End: 2020-05-19
Attending: INTERNAL MEDICINE
Payer: COMMERCIAL

## 2020-05-19 VITALS
OXYGEN SATURATION: 97 % | HEART RATE: 94 BPM | WEIGHT: 245.38 LBS | HEIGHT: 76 IN | BODY MASS INDEX: 29.88 KG/M2 | SYSTOLIC BLOOD PRESSURE: 152 MMHG | TEMPERATURE: 99 F | DIASTOLIC BLOOD PRESSURE: 90 MMHG | RESPIRATION RATE: 16 BRPM

## 2020-05-19 DIAGNOSIS — Z12.5 SCREENING FOR MALIGNANT NEOPLASM OF PROSTATE: ICD-10-CM

## 2020-05-19 DIAGNOSIS — E21.3 HYPERPARATHYROIDISM: ICD-10-CM

## 2020-05-19 DIAGNOSIS — Z00.00 ANNUAL PHYSICAL EXAM: ICD-10-CM

## 2020-05-19 DIAGNOSIS — E55.9 VITAMIN D DEFICIENCY: ICD-10-CM

## 2020-05-19 DIAGNOSIS — R73.03 PREDIABETES: ICD-10-CM

## 2020-05-19 DIAGNOSIS — I10 HYPERTENSION, UNCONTROLLED: ICD-10-CM

## 2020-05-19 DIAGNOSIS — M10.9 GOUT, UNSPECIFIED CAUSE, UNSPECIFIED CHRONICITY, UNSPECIFIED SITE: ICD-10-CM

## 2020-05-19 DIAGNOSIS — Z12.11 COLON CANCER SCREENING: ICD-10-CM

## 2020-05-19 LAB
25(OH)D3+25(OH)D2 SERPL-MCNC: 42 NG/ML (ref 30–96)
ALBUMIN SERPL BCP-MCNC: 4.3 G/DL (ref 3.5–5.2)
ALP SERPL-CCNC: 54 U/L (ref 55–135)
ALT SERPL W/O P-5'-P-CCNC: 34 U/L (ref 10–44)
ANION GAP SERPL CALC-SCNC: 11 MMOL/L (ref 8–16)
AST SERPL-CCNC: 24 U/L (ref 10–40)
BASOPHILS # BLD AUTO: 0.01 K/UL (ref 0–0.2)
BASOPHILS NFR BLD: 0.2 % (ref 0–1.9)
BILIRUB SERPL-MCNC: 0.6 MG/DL (ref 0.1–1)
BUN SERPL-MCNC: 18 MG/DL (ref 6–20)
CALCIUM SERPL-MCNC: 9.4 MG/DL (ref 8.7–10.5)
CHLORIDE SERPL-SCNC: 102 MMOL/L (ref 95–110)
CHOLEST SERPL-MCNC: 190 MG/DL (ref 120–199)
CHOLEST/HDLC SERPL: 4 {RATIO} (ref 2–5)
CO2 SERPL-SCNC: 24 MMOL/L (ref 23–29)
COMPLEXED PSA SERPL-MCNC: 0.42 NG/ML (ref 0–4)
CREAT SERPL-MCNC: 1.4 MG/DL (ref 0.5–1.4)
DIFFERENTIAL METHOD: ABNORMAL
EOSINOPHIL # BLD AUTO: 0.2 K/UL (ref 0–0.5)
EOSINOPHIL NFR BLD: 4.2 % (ref 0–8)
ERYTHROCYTE [DISTWIDTH] IN BLOOD BY AUTOMATED COUNT: 12.5 % (ref 11.5–14.5)
EST. GFR  (AFRICAN AMERICAN): >60 ML/MIN/1.73 M^2
EST. GFR  (NON AFRICAN AMERICAN): 58.6 ML/MIN/1.73 M^2
ESTIMATED AVG GLUCOSE: 151 MG/DL (ref 68–131)
GLUCOSE SERPL-MCNC: 166 MG/DL (ref 70–110)
HBA1C MFR BLD HPLC: 6.9 % (ref 4–5.6)
HCT VFR BLD AUTO: 44.9 % (ref 40–54)
HDLC SERPL-MCNC: 47 MG/DL (ref 40–75)
HDLC SERPL: 24.7 % (ref 20–50)
HGB BLD-MCNC: 13.8 G/DL (ref 14–18)
IMM GRANULOCYTES # BLD AUTO: 0.01 K/UL (ref 0–0.04)
IMM GRANULOCYTES NFR BLD AUTO: 0.2 % (ref 0–0.5)
LDLC SERPL CALC-MCNC: 94 MG/DL (ref 63–159)
LYMPHOCYTES # BLD AUTO: 1 K/UL (ref 1–4.8)
LYMPHOCYTES NFR BLD: 23.8 % (ref 18–48)
MCH RBC QN AUTO: 26.5 PG (ref 27–31)
MCHC RBC AUTO-ENTMCNC: 30.7 G/DL (ref 32–36)
MCV RBC AUTO: 86 FL (ref 82–98)
MONOCYTES # BLD AUTO: 0.4 K/UL (ref 0.3–1)
MONOCYTES NFR BLD: 8.6 % (ref 4–15)
NEUTROPHILS # BLD AUTO: 2.7 K/UL (ref 1.8–7.7)
NEUTROPHILS NFR BLD: 63 % (ref 38–73)
NONHDLC SERPL-MCNC: 143 MG/DL
NRBC BLD-RTO: 0 /100 WBC
PLATELET # BLD AUTO: 115 K/UL (ref 150–350)
PMV BLD AUTO: 12 FL (ref 9.2–12.9)
POTASSIUM SERPL-SCNC: 4.2 MMOL/L (ref 3.5–5.1)
PROT SERPL-MCNC: 7.8 G/DL (ref 6–8.4)
PTH-INTACT SERPL-MCNC: 52 PG/ML (ref 9–77)
RBC # BLD AUTO: 5.2 M/UL (ref 4.6–6.2)
SODIUM SERPL-SCNC: 137 MMOL/L (ref 136–145)
TRIGL SERPL-MCNC: 245 MG/DL (ref 30–150)
TSH SERPL DL<=0.005 MIU/L-ACNC: 2.62 UIU/ML (ref 0.4–4)
URATE SERPL-MCNC: 10.8 MG/DL (ref 3.4–7)
WBC # BLD AUTO: 4.32 K/UL (ref 3.9–12.7)

## 2020-05-19 PROCEDURE — 82306 VITAMIN D 25 HYDROXY: CPT

## 2020-05-19 PROCEDURE — 99999 PR PBB SHADOW E&M-EST. PATIENT-LVL IV: ICD-10-PCS | Mod: PBBFAC,,, | Performed by: INTERNAL MEDICINE

## 2020-05-19 PROCEDURE — 80053 COMPREHEN METABOLIC PANEL: CPT

## 2020-05-19 PROCEDURE — 84443 ASSAY THYROID STIM HORMONE: CPT

## 2020-05-19 PROCEDURE — 84153 ASSAY OF PSA TOTAL: CPT

## 2020-05-19 PROCEDURE — 84550 ASSAY OF BLOOD/URIC ACID: CPT

## 2020-05-19 PROCEDURE — 99396 PR PREVENTIVE VISIT,EST,40-64: ICD-10-PCS | Mod: S$GLB,,, | Performed by: INTERNAL MEDICINE

## 2020-05-19 PROCEDURE — 99999 PR PBB SHADOW E&M-EST. PATIENT-LVL IV: CPT | Mod: PBBFAC,,, | Performed by: INTERNAL MEDICINE

## 2020-05-19 PROCEDURE — 85025 COMPLETE CBC W/AUTO DIFF WBC: CPT

## 2020-05-19 PROCEDURE — 83970 ASSAY OF PARATHORMONE: CPT

## 2020-05-19 PROCEDURE — 99396 PREV VISIT EST AGE 40-64: CPT | Mod: S$GLB,,, | Performed by: INTERNAL MEDICINE

## 2020-05-19 PROCEDURE — 36415 COLL VENOUS BLD VENIPUNCTURE: CPT | Mod: PO

## 2020-05-19 PROCEDURE — 80061 LIPID PANEL: CPT

## 2020-05-19 PROCEDURE — 83036 HEMOGLOBIN GLYCOSYLATED A1C: CPT

## 2020-05-26 NOTE — PROGRESS NOTES
"Subjective:       Patient ID: Parminder Gregorio is a pleasant 49 y.o. Black or  male patient    Chief Complaint: Results      Patient is a pt I saw last on 4/28/2020 for an annual physical. Wanted to come back to discuss lab work results.    HPI     No specific issue to report. He went through his blood work results and has some questions at this regard.      Patient Active Problem List   Diagnosis    Hypovitaminosis D    Hypertension, uncontrolled    Idiopathic chronic gout of multiple sites with tophus    Type 2 diabetes mellitus with hyperglycemia, without long-term current use of insulin    Acute midline low back pain with right-sided sciatica    Gout    Thrombopenia          ACTIVE MEDICAL ISSUES:  Documented in Problem List     PAST MEDICAL HISTORY  Documented     PAST SURGICAL HISTORY:  Documented     SOCIAL HISTORY:  Documented     FAMILY HISTORY:  Documented     ALLERGIES AND MEDICATIONS: updated and reviewed.  Documented    Review of Systems   Constitutional: Negative for fever.   Respiratory: Negative.    Cardiovascular: Negative for chest pain, palpitations and leg swelling.   Gastrointestinal: Negative for abdominal pain.   Genitourinary: Negative for dysuria and hematuria.   Musculoskeletal: Negative for back pain.   Neurological: Negative for weakness, numbness and headaches.       Objective:      Physical Exam   Constitutional: He appears well-developed and well-nourished.   Cardiovascular: Normal rate, regular rhythm, normal heart sounds and intact distal pulses.   Pulmonary/Chest: Effort normal and breath sounds normal.   Nursing note and vitals reviewed.      Vitals:    05/27/20 1125   BP: (!) 142/90   BP Location: Left arm   Patient Position: Sitting   BP Method: Large (Manual)   Pulse: 81   Resp: 16   Temp: 98.7 °F (37.1 °C)   TempSrc: Oral   SpO2: 98%   Weight: 112.9 kg (248 lb 14.4 oz)   Height: 6' 4" (1.93 m)     Body mass index is 30.3 kg/m².    RESULTS: Reviewed labs from " last 12 months    Assessment:       1. Type 2 diabetes mellitus with hyperglycemia, without long-term current use of insulin    2. Hypertension, uncontrolled    3. Gout, unspecified cause, unspecified chronicity, unspecified site    4. Thrombopenia        Plan:   Parminder was seen today for follow-up.    Diagnoses and all orders for this visit:    Type 2 diabetes mellitus with hyperglycemia, without long-term current use of insulin  -     metFORMIN (GLUCOPHAGE) 500 MG tablet; Take 1 tablet (500 mg total) by mouth once daily. In the evening on a full stomach  -     Hemoglobin A1C; Future    New diagnosis. Went from preDM to DM. Will start him on metformin. Education provided on expected results, how to take it optimally, possible side effects, and the need to work on lifestyle with detailed explanations.    Hypertension, uncontrolled  -     Basic metabolic panel; Future    Still a bit above goal, will check in 3 months. Will work on lifestyle including salt intake.    Gout, unspecified cause, unspecified chronicity, unspecified site  -     allopurinoL (ZYLOPRIM) 100 MG tablet; Take 1 tablet (100 mg total) by mouth once daily.  -     Uric acid; Future    Uric acid elevated, no flare for more than 8 months. Will start low dose of allopurinol. Do not dare to give him NSAIDs for the first month due to BP and DM. Educated on diet. Written material provided.    Thrombopenia  -     CBC auto differential; Future    New finding. Etiology? Will monitor. Pt aware of the results and the need to call if any concern such as gum bleeding or other, however level is not low enough to cause similar issues.    Follow up in about 3 months (around 8/27/2020) for f-up DM and gout after blood work.

## 2020-05-27 ENCOUNTER — OFFICE VISIT (OUTPATIENT)
Dept: FAMILY MEDICINE | Facility: CLINIC | Age: 50
End: 2020-05-27
Payer: COMMERCIAL

## 2020-05-27 VITALS
WEIGHT: 248.88 LBS | DIASTOLIC BLOOD PRESSURE: 90 MMHG | OXYGEN SATURATION: 98 % | SYSTOLIC BLOOD PRESSURE: 142 MMHG | HEIGHT: 76 IN | BODY MASS INDEX: 30.31 KG/M2 | TEMPERATURE: 99 F | RESPIRATION RATE: 16 BRPM | HEART RATE: 81 BPM

## 2020-05-27 DIAGNOSIS — I10 HYPERTENSION, UNCONTROLLED: ICD-10-CM

## 2020-05-27 DIAGNOSIS — M10.9 GOUT, UNSPECIFIED CAUSE, UNSPECIFIED CHRONICITY, UNSPECIFIED SITE: ICD-10-CM

## 2020-05-27 DIAGNOSIS — D69.6 THROMBOPENIA: ICD-10-CM

## 2020-05-27 DIAGNOSIS — E11.65 TYPE 2 DIABETES MELLITUS WITH HYPERGLYCEMIA, WITHOUT LONG-TERM CURRENT USE OF INSULIN: Primary | ICD-10-CM

## 2020-05-27 PROCEDURE — 99999 PR PBB SHADOW E&M-EST. PATIENT-LVL III: CPT | Mod: PBBFAC,,, | Performed by: INTERNAL MEDICINE

## 2020-05-27 PROCEDURE — 99999 PR PBB SHADOW E&M-EST. PATIENT-LVL III: ICD-10-PCS | Mod: PBBFAC,,, | Performed by: INTERNAL MEDICINE

## 2020-05-27 PROCEDURE — 99214 OFFICE O/P EST MOD 30 MIN: CPT | Mod: S$GLB,,, | Performed by: INTERNAL MEDICINE

## 2020-05-27 PROCEDURE — 99214 PR OFFICE/OUTPT VISIT, EST, LEVL IV, 30-39 MIN: ICD-10-PCS | Mod: S$GLB,,, | Performed by: INTERNAL MEDICINE

## 2020-05-27 RX ORDER — ALLOPURINOL 100 MG/1
100 TABLET ORAL DAILY
Qty: 90 TABLET | Refills: 3 | Status: SHIPPED | OUTPATIENT
Start: 2020-05-27 | End: 2021-04-13 | Stop reason: SDUPTHER

## 2020-05-27 RX ORDER — METFORMIN HYDROCHLORIDE 500 MG/1
500 TABLET ORAL DAILY
Qty: 90 TABLET | Refills: 0 | Status: SHIPPED | OUTPATIENT
Start: 2020-05-27 | End: 2020-09-08 | Stop reason: SDUPTHER

## 2020-05-27 NOTE — PATIENT INSTRUCTIONS
Diet: Diabetes  Food is an important tool that you can use to control diabetes and stay healthy. Eating well-balanced meals in the correct amounts will help you control your blood glucose levels and prevent low blood sugar reactions. It will also help you reduce the health risks of diabetes. There is no one specific diet that is right for everyone with diabetes. But there are general guidelines to follow. A registered dietitian (RD) will create a tailored diet approach thats just right for you. He or she will also help you plan healthy meals and snacks. If you have any questions, call your dietitian for advice.     Guidelines for success  Talk with your healthcare provider before starting a diabetes diet or weight loss program. If you haven't talked with a dietitian yet, ask your provider for a referral. The following guidelines can help you succeed:  · Select foods from the 6 food groups below. Your dietitian will help you find food choices within each group. He or she will also show you serving sizes and how many servings you can have at each meal.  ¨ Grains, beans, and starchy vegetables  ¨ Vegetables  ¨ Fruit  ¨ Milk or yogurt  ¨ Meat, poultry, fish, or tofu  ¨ Healthy fats  · Check your blood sugar levels as directed by your provider. Take any medicine as prescribed by your provider.  · Learn to read food labels and pick the right portion sizes.  · Eat only the amount of food in your meal plan. Eat about the same amount of food at regular times each day. Dont skip meals. Eat meals 4 to 5 hours apart, with snacks in between.  · Limit alcohol. It raises blood sugar levels. Drink water or calorie-free diet drinks that use safe sweeteners.  · Eat less fat to help lower your risk of heart disease. Use nonfat or low-fat dairy products and lean meats. Avoid fried foods. Use cooking oils that are unsaturated, such as olive, canola, or peanut oil.  · Talk with your dietitian about safe sugar substitutes.  · Avoid  added salt. It can contribute to high blood pressure, which can cause heart disease. People with diabetes already have a risk of high blood pressure and heart disease.  · Stay at a healthy weight. If you need to lose weight, cut down on your portion sizes. But dont skip meals. Exercise is an important part of any weight management program. Talk with your provider about an exercise program thats right for you.  · For more information about the best diet plan for you, talk with a registered dietitian (RD). To find an RD in your area, contact:  ¨ Academy of Nutrition and Dietetics www.eatright.org  ¨ The American Diabetes Association 309-739-7360 www.diabetes.org  Date Last Reviewed: 8/1/2016 © 2000-2017 [employer] [facility] [place]. [address] [place] [address], [place] [address]. All rights reserved. This information is not intended as a substitute for professional medical care. Always follow your healthcare professional's instructions.

## 2020-05-28 PROBLEM — M10.9 GOUT: Status: ACTIVE | Noted: 2020-05-28

## 2020-05-28 PROBLEM — D69.6 THROMBOPENIA: Status: ACTIVE | Noted: 2020-05-28

## 2020-06-15 ENCOUNTER — OFFICE VISIT (OUTPATIENT)
Dept: FAMILY MEDICINE | Facility: CLINIC | Age: 50
End: 2020-06-15
Payer: COMMERCIAL

## 2020-06-15 VITALS
DIASTOLIC BLOOD PRESSURE: 80 MMHG | RESPIRATION RATE: 16 BRPM | OXYGEN SATURATION: 97 % | HEIGHT: 76 IN | SYSTOLIC BLOOD PRESSURE: 138 MMHG | TEMPERATURE: 99 F | HEART RATE: 92 BPM | WEIGHT: 248 LBS | BODY MASS INDEX: 30.2 KG/M2

## 2020-06-15 DIAGNOSIS — M10.9 GOUT, UNSPECIFIED CAUSE, UNSPECIFIED CHRONICITY, UNSPECIFIED SITE: ICD-10-CM

## 2020-06-15 DIAGNOSIS — M70.21 OLECRANON BURSITIS OF RIGHT ELBOW: Primary | ICD-10-CM

## 2020-06-15 DIAGNOSIS — E11.65 TYPE 2 DIABETES MELLITUS WITH HYPERGLYCEMIA, WITHOUT LONG-TERM CURRENT USE OF INSULIN: ICD-10-CM

## 2020-06-15 DIAGNOSIS — I10 HYPERTENSION, UNCONTROLLED: ICD-10-CM

## 2020-06-15 PROCEDURE — 99213 OFFICE O/P EST LOW 20 MIN: CPT | Mod: S$GLB,,, | Performed by: INTERNAL MEDICINE

## 2020-06-15 PROCEDURE — 99213 PR OFFICE/OUTPT VISIT, EST, LEVL III, 20-29 MIN: ICD-10-PCS | Mod: S$GLB,,, | Performed by: INTERNAL MEDICINE

## 2020-06-15 PROCEDURE — 99999 PR PBB SHADOW E&M-EST. PATIENT-LVL IV: CPT | Mod: PBBFAC,,, | Performed by: INTERNAL MEDICINE

## 2020-06-15 PROCEDURE — 99999 PR PBB SHADOW E&M-EST. PATIENT-LVL IV: ICD-10-PCS | Mod: PBBFAC,,, | Performed by: INTERNAL MEDICINE

## 2020-06-15 NOTE — PROGRESS NOTES
Subjective:       Patient ID: Parminder Gregorio is a pleasant 49 y.o. Black or  male patient    Chief Complaint: Joint Swelling (right elbow, swelled up thursday)      Patient is a pt I saw last on 5/27/2020 for f-up DM and other issues, see my notes and list of problems below. He comes today due to elbow pain.    HPI     Elbow pain (R)  This is a recurrent problem (on and off, severe in 2016, when he had a local injection?).   The current episode started 4 days ago.   The problem occurs daily.   Pain is rated 0-1/10.  The problem has been unchanged.   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   There is swelling of the elbow, lateral.  No associated symptoms   Pt has tried NSAIDs for the symptoms. The treatment provided moderate relief.    Patient Active Problem List   Diagnosis    Hypovitaminosis D    Hypertension, uncontrolled    Idiopathic chronic gout of multiple sites with tophus    Type 2 diabetes mellitus with hyperglycemia, without long-term current use of insulin    Acute midline low back pain with right-sided sciatica    Gout    Thrombopenia          ACTIVE MEDICAL ISSUES:  Documented in Problem List     PAST MEDICAL HISTORY  Documented     PAST SURGICAL HISTORY:  Documented     SOCIAL HISTORY:  Documented     FAMILY HISTORY:  Documented     ALLERGIES AND MEDICATIONS: updated and reviewed.  Documented    Review of Systems   Constitutional: Negative.    HENT: Negative.    Respiratory: Negative.    Cardiovascular: Negative.    Musculoskeletal: Positive for arthralgias.        R elbow   All other systems reviewed and are negative.      Objective:      Physical Exam  Vitals signs and nursing note reviewed.   Constitutional:       Appearance: Normal appearance. He is obese.   Cardiovascular:      Rate and Rhythm: Normal rate and regular rhythm.      Pulses: Normal pulses.      Heart sounds:  "Normal heart sounds.   Pulmonary:      Effort: Pulmonary effort is normal.      Breath sounds: Normal breath sounds.   Musculoskeletal:      Comments: Presence of swelling and a small effusion to the right olecranon process area, 2/3 cm, not painful to palpation, no redness, no increased local temperature, mobilization fine   Neurological:      Mental Status: He is alert.         Vitals:    06/15/20 1421   BP: 138/80   BP Location: Left arm   Patient Position: Sitting   BP Method: Large (Manual)   Pulse: 92   Resp: 16   Temp: 99.2 °F (37.3 °C)   TempSrc: Oral   SpO2: 97%   Weight: 112.5 kg (248 lb 0.3 oz)   Height: 6' 4" (1.93 m)     Body mass index is 30.19 kg/m².    RESULTS: Reviewed labs from last 12 months    Assessment:       1. Olecranon bursitis of right elbow    2. Hypertension, uncontrolled    3. Gout, unspecified cause, unspecified chronicity, unspecified site    4. Type 2 diabetes mellitus with hyperglycemia, without long-term current use of insulin        Plan:   Parminder was seen today for joint swelling.    Diagnoses and all orders for this visit:    Olecranon bursitis of right elbow  -     Ambulatory referral/consult to Orthopedics; Future    See HPI and PE. Recurrence. Was supposed to possibly have surgery in 2016 but insurance denied MRI. Not painful. No suspicion of infection. I doubt it is related to his gout issues. Advised to go on taking NSAIDs, to apply ice, and to protect the area during the night. Referred to Orthopedics. Advised re: symptoms and signs of concern that may prompt him to seek for immediate medical attention.    Hypertension, uncontrolled    Will monitor at next visit.    Gout, unspecified cause, unspecified chronicity, unspecified site    Started pt on low dose of allopurinol with no issues.     Type 2 diabetes mellitus with hyperglycemia, without long-term current use of insulin    Pt started on metformin with no issue.     Follow up if symptoms worsen or fail to improve.    "

## 2020-06-22 ENCOUNTER — PATIENT OUTREACH (OUTPATIENT)
Dept: ADMINISTRATIVE | Facility: OTHER | Age: 50
End: 2020-06-22

## 2020-06-22 NOTE — PROGRESS NOTES
Patient's chart was reviewed.  Immunizations failed. Not in LINKS.    Health Maintenance was updated.

## 2020-06-24 DIAGNOSIS — M25.521 ELBOW PAIN, RIGHT: Primary | ICD-10-CM

## 2020-06-25 ENCOUNTER — PATIENT OUTREACH (OUTPATIENT)
Dept: ADMINISTRATIVE | Facility: HOSPITAL | Age: 50
End: 2020-06-25

## 2020-06-25 ENCOUNTER — OFFICE VISIT (OUTPATIENT)
Dept: ORTHOPEDICS | Facility: CLINIC | Age: 50
End: 2020-06-25
Payer: COMMERCIAL

## 2020-06-25 VITALS
DIASTOLIC BLOOD PRESSURE: 88 MMHG | HEART RATE: 80 BPM | SYSTOLIC BLOOD PRESSURE: 146 MMHG | RESPIRATION RATE: 18 BRPM | BODY MASS INDEX: 30.79 KG/M2 | HEIGHT: 76 IN | OXYGEN SATURATION: 98 % | WEIGHT: 252.88 LBS

## 2020-06-25 DIAGNOSIS — M54.41 ACUTE MIDLINE LOW BACK PAIN WITH RIGHT-SIDED SCIATICA: ICD-10-CM

## 2020-06-25 DIAGNOSIS — M70.21 OLECRANON BURSITIS OF RIGHT ELBOW: ICD-10-CM

## 2020-06-25 LAB
APPEARANCE FLD: NORMAL
BODY FLD TYPE: NORMAL
COLOR FLD: NORMAL
LYMPHOCYTES NFR FLD MANUAL: 3 %
MESOTHL CELL NFR FLD MANUAL: 1 %
MONOS+MACROS NFR FLD MANUAL: 3 %
NEUTROPHILS NFR FLD MANUAL: 93 %
WBC # FLD: NORMAL /CU MM

## 2020-06-25 PROCEDURE — 89051 BODY FLUID CELL COUNT: CPT

## 2020-06-25 PROCEDURE — 20605 INTERMEDIATE JOINT ASPIRATION/INJECTION: R OLECRANON BURSA: ICD-10-PCS | Mod: RT,S$GLB,, | Performed by: PHYSICIAN ASSISTANT

## 2020-06-25 PROCEDURE — 99999 PR PBB SHADOW E&M-EST. PATIENT-LVL IV: CPT | Mod: PBBFAC,,, | Performed by: PHYSICIAN ASSISTANT

## 2020-06-25 PROCEDURE — 99999 PR PBB SHADOW E&M-EST. PATIENT-LVL IV: ICD-10-PCS | Mod: PBBFAC,,, | Performed by: PHYSICIAN ASSISTANT

## 2020-06-25 PROCEDURE — 99203 PR OFFICE/OUTPT VISIT, NEW, LEVL III, 30-44 MIN: ICD-10-PCS | Mod: 25,S$GLB,, | Performed by: PHYSICIAN ASSISTANT

## 2020-06-25 PROCEDURE — 20605 DRAIN/INJ JOINT/BURSA W/O US: CPT | Mod: RT,S$GLB,, | Performed by: PHYSICIAN ASSISTANT

## 2020-06-25 PROCEDURE — 99203 OFFICE O/P NEW LOW 30 MIN: CPT | Mod: 25,S$GLB,, | Performed by: PHYSICIAN ASSISTANT

## 2020-06-25 RX ORDER — MELOXICAM 15 MG/1
15 TABLET ORAL DAILY PRN
Qty: 30 TABLET | Refills: 0 | Status: SHIPPED | OUTPATIENT
Start: 2020-06-25 | End: 2022-07-29

## 2020-06-25 NOTE — LETTER
June 25, 2020      Sadie Figueroa MD  [address] [place] [address]           [facility]  [address]  [facility] [place] [place] [address]  Phone: [phone]          Patient: Parminder Gregorio   MR Number: [id number]   YOB: 1970   Date of Visit: 6/25/2020       Dear Dr. Sadie Figueroa:    Thank you for referring Parminder Gregorio to me for evaluation. Attached you will find relevant portions of my assessment and plan of care.    If you have questions, please do not hesitate to call me. I look forward to following Parminder Gregorio along with you.    Sincerely,    Vanessa Fajardo PA-C    Enclosure  CC:  No Recipients    If you would like to receive this communication electronically, please contact [email or web address] or [phone] to request more information on [employer] Link access.    For providers and/or their staff who would like to refer a patient to [facility], please contact us through our one-stop-shop provider referral line, [facility], at [phone].    If you feel you have received this communication in error or would no longer like to receive these types of communications, please e-mail [email or web address]

## 2020-06-25 NOTE — PROCEDURES
Intermediate Joint Aspiration/Injection: R olecranon bursa    Date/Time: 6/25/2020 8:00 AM  Performed by: Vanessa Fajardo PA-C  Authorized by: Vanessa Fajardo PA-C     Consent Done?:  Yes (Verbal)  Indications:  Joint swelling    Location:  Elbow  Site:  R olecranon bursa  Prep: Patient was prepped and draped in usual sterile fashion    Needle size:  18 G  Approach:  Posterior  Aspirate amount (ml):  4  Aspirate:  Cloudy and purulent  Patient tolerance:  Patient tolerated the procedure well with no immediate complications

## 2020-06-25 NOTE — PROGRESS NOTES
Chief Complaint & History of Present Illness:  Parminder Gregorio is a 49 y.o. year old male presenting right elbow pain for 2 weeks.  He states the swelling began about 2 weeks ago.  The swelling has improved and he denies fever, chills or drainage.  He has tried ice and compression as well as occasional NSAIDS.  He has had problems with this elbow in the past.  His pain is worse at night and with bending his elbow.  He denies any numbness or tingling.  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  He has not had any prior surgery on the elbow.    Review of patient's allergies indicates:  No Known Allergies      Current Outpatient Medications   Medication Sig Dispense Refill    allopurinoL (ZYLOPRIM) 100 MG tablet Take 1 tablet (100 mg total) by mouth once daily. 90 tablet 3    metFORMIN (GLUCOPHAGE) 500 MG tablet Take 1 tablet (500 mg total) by mouth once daily. In the evening on a full stomach 90 tablet 0    fluticasone propionate (FLONASE) 50 mcg/actuation nasal spray 1 spray (50 mcg total) by Each Nostril route 2 (two) times daily. (Patient not taking: Reported on 1/21/2020) 16 g 0    gabapentin (NEURONTIN) 300 MG capsule Take 1 capsule (300 mg total) by mouth daily as needed (for sciatic pain). Take in the evening. Careful to sleepiness (Patient not taking: Reported on 6/25/2020) 30 capsule 2    meloxicam (MOBIC) 15 MG tablet Take 1 tablet (15 mg total) by mouth daily as needed for Pain. On a full stomach (Patient not taking: Reported on 5/19/2020) 30 tablet 0     No current facility-administered medications for this visit.        Past Medical History:   Diagnosis Date    Gout     Gout     Hypertension     Type 2 diabetes mellitus with hyperglycemia, without long-term current use of insulin        Past Surgical History:   Procedure Laterality Date    LEG SURGERY         Vital Signs (Most Recent)  Vitals:    06/25/20 0818   BP: (!) 146/88   Pulse: 80   Resp: 18  "          Review of Systems:  ROS:  Constitutional: no fever or chills  Eyes: no visual changes  ENT: no nasal congestion or sore throat  Respiratory: no cough or shortness of breath  Cardiovascular: no chest pain or palpitations  Gastrointestinal: no nausea or vomiting, tolerating diet  Genitourinary: no hematuria or dysuria  Integument/Breast: no rash or pruritis  Hematologic/Lymphatic: no easy bruising or lymphadenopathy  Musculoskeletal: no arthralgias or myalgias  Neurological: no seizures or tremors  Behavioral/Psych: no auditory or visual hallucinations  Endocrine: no heat or cold intolerance        OBJECTIVE:     PHYSICAL EXAM:  Height: 6' 4" (193 cm) Weight: 114.7 kg (252 lb 13.9 oz), General Appearance: Well nourished, well developed, in no acute distress.  CV: 2+ UE/LE distal pulses bilaterally.  Resp:  Respirations equal and unlabored.  Neurological: Mood & affect are normal.  GI: Abdomen soft and non-tender.    Right Elbow:   Skin intact, no abrasion or laceration  FROM  There is mild erythema over bursa  There is mild swelling and fluctuance of olecranon bursa  Mild diffuse TTP  Normal strength testing  ltsi C5-T1  + epl, io, fds, fdp   2+ RP     RADIOGRAPHS:  X-rays of the right elbow, personally reviewed by me, demonstrate osteophyte along olecranon, soft tissue swelling of bursa.  No fracture or dislocation.    ASSESSMENT/PLAN:     49 year old male with right elbow olecranon bursitis    - Aspiration performed today, cloudy white fluid.  Likely tophus related to gout.  Doubt infection due to improvement over the past two weeks and history of gout.  - Continue compression, ice, NSAID- refill of meloxicam given  - Sent for cultures, gram stain, WBC and crystals  - Will call with results.  Follow up 2 weeks or sooner if symptoms not improved  "

## 2020-06-26 DIAGNOSIS — E11.9 TYPE 2 DIABETES MELLITUS WITHOUT COMPLICATION: ICD-10-CM

## 2020-06-29 ENCOUNTER — TELEPHONE (OUTPATIENT)
Dept: ORTHOPEDICS | Facility: CLINIC | Age: 50
End: 2020-06-29

## 2020-06-29 NOTE — TELEPHONE ENCOUNTER
Spoke with patient about symptoms- he states swelling in elbow returned- no drainage or warmth.  Continues to have some tenderness.  Will have him follow up this week.

## 2020-06-29 NOTE — TELEPHONE ENCOUNTER
Appt scheduled for 7/2 @ 1:30. Spoke with patient who is aware of the appt date and time.     ----- Message from Vanessa Fajardo PA-C sent at 6/29/2020  4:03 PM CDT -----  Can you put him on my schedule at [place] for Thursday 7/2? I had an opening at 1:30.  Thanks!

## 2020-07-02 ENCOUNTER — OFFICE VISIT (OUTPATIENT)
Dept: ORTHOPEDICS | Facility: CLINIC | Age: 50
End: 2020-07-02
Payer: COMMERCIAL

## 2020-07-02 VITALS
SYSTOLIC BLOOD PRESSURE: 138 MMHG | HEART RATE: 100 BPM | WEIGHT: 244.69 LBS | HEIGHT: 76 IN | TEMPERATURE: 98 F | BODY MASS INDEX: 29.8 KG/M2 | DIASTOLIC BLOOD PRESSURE: 89 MMHG | RESPIRATION RATE: 18 BRPM

## 2020-07-02 DIAGNOSIS — M1A.0211 IDIOPATHIC CHRONIC GOUT OF RIGHT ELBOW WITH TOPHUS: ICD-10-CM

## 2020-07-02 DIAGNOSIS — M70.21 OLECRANON BURSITIS OF RIGHT ELBOW: Primary | ICD-10-CM

## 2020-07-02 PROBLEM — M1A.9XX1: Status: ACTIVE | Noted: 2020-07-02

## 2020-07-02 PROCEDURE — 99999 PR PBB SHADOW E&M-EST. PATIENT-LVL III: ICD-10-PCS | Mod: PBBFAC,,, | Performed by: PHYSICIAN ASSISTANT

## 2020-07-02 PROCEDURE — 99214 PR OFFICE/OUTPT VISIT, EST, LEVL IV, 30-39 MIN: ICD-10-PCS | Mod: S$GLB,,, | Performed by: PHYSICIAN ASSISTANT

## 2020-07-02 PROCEDURE — 99999 PR PBB SHADOW E&M-EST. PATIENT-LVL III: CPT | Mod: PBBFAC,,, | Performed by: PHYSICIAN ASSISTANT

## 2020-07-02 PROCEDURE — 99214 OFFICE O/P EST MOD 30 MIN: CPT | Mod: S$GLB,,, | Performed by: PHYSICIAN ASSISTANT

## 2020-07-02 NOTE — PROGRESS NOTES
"Patient ID: Parminder Gregorio is a 49 y.o. male.    Chief Complaint: Pain of the Right Elbow      HISTORY:  Parminder Gregorio is a 49 y.o. male with PMH gout, DM who returns to me today for follow up of right elbow olecranon bursitis.  He was last seen by me 6/25/2020.  Today he is doing well, but notes some of the swelling has returned.  He denies fever, chills, warmth or drainage.  He reports some mild tenderness and pain at night when his arm is extended.  He did not have much relief with the aspiration last week.  He also has not improved with ice, rest, compression, and NSAIDS.  He has had problems with his elbow intermittently in the past and  he is interested in having the bursa excised.    Allergies:  No known allergies    PMH/PSH/FamHx/SocHx:    Past Medical History:   Diagnosis Date    Gout     Gout     Hypertension     Type 2 diabetes mellitus with hyperglycemia, without long-term current use of insulin               Past Surgical History:   Procedure Laterality Date    LEG SURGERY           ROS:  Constitution: Negative for chills, fever and weakness.   Cardiovascular: Negative for chest pain.   Respiratory: Negative for cough and shortness of breath.   Hematologic/Lymphatic: Negative for bleeding problem. Does not bruise/bleed easily.   Skin: Negative for color change, itching and poor wound healing.   Musculoskeletal: Positive for right elbow pain  Gastrointestinal: Negative for heartburn.   Neurological: Negative for dizziness, focal weakness, numbness, paresthesias and sensory change.   Psychiatric/Behavioral: The patient is not nervous/anxious.   Allergic/Immunologic: Negative for environmental allergies and persistent infections.     PHYSICAL EXAM:   /89 (BP Location: Right arm, Patient Position: Sitting, BP Method: Large (Automatic))   Pulse 100   Temp 98.2 °F (36.8 °C)   Resp 18   Ht 6' 4" (1.93 m)   Wt 111 kg (244 lb 11.4 oz)   BMI 29.79 kg/m²     Right Elbow  Skin intact, no effusion or " warmth  No wound, drainage, or evidence of infection  ROM 0-120  There is a small superficial area of erythema  Mild TTP olecranon bursa with fluctuance  5/5 biceps, 5/5 triceps  Sensation to light touch intact  2+ RP    IMAGING: No new imaging today.  Previous x-rays show well maintained joint space, no fracture or dislocation.  Soft tissue swelling.     ASSESSMENT/PLAN:    Parminder was seen today for pain.    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  He has not had much improvement with this and it continues to be an ongoing problem.  We also discussed risks of continued aspiration, such as infection.    - He is interested in surgical excision of the right olecranon bursa.    - Patient was seen with Dr. Dai and surgery was discussed.  Right elbow olecranon bursa excision was recommended  - The patient is a [employer].  - Surgical consents were signed and he will be sent for pre-op evaluation.  He will follow up after surgery.

## 2020-07-02 NOTE — H&P (VIEW-ONLY)
"Patient ID: Parminder Gregorio is a 49 y.o. male.    Chief Complaint: Pain of the Right Elbow      HISTORY:  Parminder Gregorio is a 49 y.o. male with PMH gout, DM who returns to me today for follow up of right elbow olecranon bursitis.  He was last seen by me 6/25/2020.  Today he is doing well, but notes some of the swelling has returned.  He denies fever, chills, warmth or drainage.  He reports some mild tenderness and pain at night when his arm is extended.  He did not have much relief with the aspiration last week.  He also has not improved with ice, rest, compression, and NSAIDS.  He has had problems with his elbow intermittently in the past and  he is interested in having the bursa excised.    Allergies:  No known allergies    PMH/PSH/FamHx/SocHx:    Past Medical History:   Diagnosis Date    Gout     Gout     Hypertension     Type 2 diabetes mellitus with hyperglycemia, without long-term current use of insulin               Past Surgical History:   Procedure Laterality Date    LEG SURGERY           ROS:  Constitution: Negative for chills, fever and weakness.   Cardiovascular: Negative for chest pain.   Respiratory: Negative for cough and shortness of breath.   Hematologic/Lymphatic: Negative for bleeding problem. Does not bruise/bleed easily.   Skin: Negative for color change, itching and poor wound healing.   Musculoskeletal: Positive for right elbow pain  Gastrointestinal: Negative for heartburn.   Neurological: Negative for dizziness, focal weakness, numbness, paresthesias and sensory change.   Psychiatric/Behavioral: The patient is not nervous/anxious.   Allergic/Immunologic: Negative for environmental allergies and persistent infections.     PHYSICAL EXAM:   /89 (BP Location: Right arm, Patient Position: Sitting, BP Method: Large (Automatic))   Pulse 100   Temp 98.2 °F (36.8 °C)   Resp 18   Ht 6' 4" (1.93 m)   Wt 111 kg (244 lb 11.4 oz)   BMI 29.79 kg/m²     Right Elbow  Skin intact, no effusion or " warmth  No wound, drainage, or evidence of infection  ROM 0-120  There is a small superficial area of erythema  Mild TTP olecranon bursa with fluctuance  5/5 biceps, 5/5 triceps  Sensation to light touch intact  2+ RP    IMAGING: No new imaging today.  Previous x-rays show well maintained joint space, no fracture or dislocation.  Soft tissue swelling.     ASSESSMENT/PLAN:    Parminder was seen today for pain.    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  He has not had much improvement with this and it continues to be an ongoing problem.  We also discussed risks of continued aspiration, such as infection.    - He is interested in surgical excision of the right olecranon bursa.    - Patient was seen with Dr. Dai and surgery was discussed.  Right elbow olecranon bursa excision was recommended  - The patient is a [employer].  - Surgical consents were signed and he will be sent for pre-op evaluation.  He will follow up after surgery.

## 2020-07-07 NOTE — PROGRESS NOTES
Subjective:       Patient ID: Parminder Gregorio is a pleasant 49 y.o. Black or  male patient    Chief Complaint: Pre-op Exam      Patient is a pt I saw last on 6/15/2020.  See my last notes and the list of problems below.  Comes for preop clearance before bursectomy of olecranon by Dr. Dai, scheduled for 7/22/2020.    HPI     Last visit in Orthopedics on 7/2/202. Had aspiration of bursa on 6/25/2020, with no major relief, it was not the first episode and he elected to have it excised.  He has no concern today, he is very eager to have his surgery done.  He is an active patient, he rode his bike for 10 miles yesterday.   He denies any chest pain, palpitations, or shortness of breath.  No gout flare.  He takes his medications as directed.        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  Documented    Review of Systems   Constitutional: Negative.    HENT: Negative.    Respiratory: Negative.    Cardiovascular: Negative.    Musculoskeletal: Positive for arthralgias.        R elbow   All other systems reviewed and are negative.      Objective:      Physical Exam  Vitals signs and nursing note reviewed.   Constitutional:       Appearance: He is well-developed.   HENT:      Right Ear: External ear normal.      Left Ear: External ear normal.   Eyes:      Conjunctiva/sclera: Conjunctivae normal.      Pupils: Pupils are equal, round, and reactive to light.   Neck:       "Musculoskeletal: Normal range of motion.      Thyroid: No thyromegaly.   Cardiovascular:      Rate and Rhythm: Normal rate and regular rhythm.      Heart sounds: Normal heart sounds.   Pulmonary:      Effort: Pulmonary effort is normal.      Breath sounds: Normal breath sounds. No wheezing.   Chest:      Chest wall: No tenderness.   Abdominal:      General: Bowel sounds are normal.      Palpations: Abdomen is soft. There is no mass.      Tenderness: There is no abdominal tenderness.   Genitourinary:     Prostate: Normal.      Rectum: Normal.   Musculoskeletal: Normal range of motion.         General: No tenderness or deformity.      Comments: Large bursitis of R elbow   Lymphadenopathy:      Cervical: No cervical adenopathy.   Skin:     General: Skin is warm and dry.   Neurological:      Mental Status: He is alert and oriented to person, place, and time.   Psychiatric:         Behavior: Behavior normal.         Thought Content: Thought content normal.         Judgment: Judgment normal.         Vitals:    07/08/20 0805   BP: 118/80   BP Location: Left arm   Patient Position: Sitting   BP Method: Large (Manual)   Pulse: 74   Resp: 16   Temp: 97.9 °F (36.6 °C)   TempSrc: Oral   SpO2: 97%   Weight: 114.5 kg (252 lb 6.8 oz)   Height: 6' 4" (1.93 m)     Body mass index is 30.73 kg/m².    EKG 7/8/2020.    RESULTS: Reviewed labs from last 12  Months    Vent. Rate : 072 BPM     Atrial Rate : 072 BPM      P-R Int : 156 ms          QRS Dur : 084 ms       QT Int : 396 ms       P-R-T Axes : 027 027 029 degrees      QTc Int : 433 ms     Normal sinus rhythm   Normal ECG   No previous ECGs available     Assessment:       1. Preoperative clearance    2. Olecranon bursitis of right elbow    3. Hypertension, uncontrolled    4. BMI 30.0-30.9,adult    5. Type 2 diabetes mellitus with hyperglycemia, without long-term current use of insulin    6. Gout, unspecified cause, unspecified chronicity, unspecified site    7. Thrombopenia      "   Plan:   Parminder was seen today for pre-op exam.    Diagnoses and all orders for this visit:    Preoperative clearance  -     Comprehensive metabolic panel; Future  -     EKG 12-lead    Patient is going to have excision of for bursa of right elbow on the 7/26/2020  by Dr. Dai.  I repeated blood work that is fine and EKG to have a BL (RSR, normal findings).  See HPI.  Patient is low cardiac risk for moderate risk surgery.  Of note he is Jehovah witness.  I told him to hold his metformin the day before the procedure and not to take any anti-inflammatory or aspirin for 10 days before the surgery.    Olecranon bursitis of right elbow    See above.    Hypertension, uncontrolled    BP at goal.  Diet control only.    BMI 30.0-30.9,adult    Working on his lifestyle.    Type 2 diabetes mellitus with hyperglycemia, without long-term current use of insulin    Last A1c at goal.    Gout, unspecified cause, unspecified chronicity, unspecified site  -     Uric acid; Future    No recent flare, started allopurinol at last visit.  Patient wanted to check uric acid today. Went down from 10.8 to 8.2.     Thrombopenia  -     CBC auto differential; Future    Platelets 115 at last blood work I do not have a comparison. I will recheck today.  Etiology?    Patient is [employer]    Follow up if symptoms worsen or fail to improve.

## 2020-07-08 ENCOUNTER — LAB VISIT (OUTPATIENT)
Dept: LAB | Facility: HOSPITAL | Age: 50
End: 2020-07-08
Attending: INTERNAL MEDICINE
Payer: COMMERCIAL

## 2020-07-08 ENCOUNTER — OFFICE VISIT (OUTPATIENT)
Dept: FAMILY MEDICINE | Facility: CLINIC | Age: 50
End: 2020-07-08
Payer: COMMERCIAL

## 2020-07-08 VITALS
SYSTOLIC BLOOD PRESSURE: 118 MMHG | BODY MASS INDEX: 30.74 KG/M2 | DIASTOLIC BLOOD PRESSURE: 80 MMHG | RESPIRATION RATE: 16 BRPM | WEIGHT: 252.44 LBS | OXYGEN SATURATION: 97 % | HEART RATE: 74 BPM | TEMPERATURE: 98 F | HEIGHT: 76 IN

## 2020-07-08 DIAGNOSIS — Z01.818 PREOPERATIVE CLEARANCE: Primary | ICD-10-CM

## 2020-07-08 DIAGNOSIS — M10.9 GOUT, UNSPECIFIED CAUSE, UNSPECIFIED CHRONICITY, UNSPECIFIED SITE: ICD-10-CM

## 2020-07-08 DIAGNOSIS — D69.6 THROMBOPENIA: ICD-10-CM

## 2020-07-08 DIAGNOSIS — I10 HYPERTENSION, UNCONTROLLED: ICD-10-CM

## 2020-07-08 DIAGNOSIS — Z01.818 PREOPERATIVE CLEARANCE: ICD-10-CM

## 2020-07-08 DIAGNOSIS — M70.21 OLECRANON BURSITIS OF RIGHT ELBOW: ICD-10-CM

## 2020-07-08 DIAGNOSIS — E11.65 TYPE 2 DIABETES MELLITUS WITH HYPERGLYCEMIA, WITHOUT LONG-TERM CURRENT USE OF INSULIN: ICD-10-CM

## 2020-07-08 PROBLEM — M54.41 ACUTE MIDLINE LOW BACK PAIN WITH RIGHT-SIDED SCIATICA: Status: RESOLVED | Noted: 2020-04-28 | Resolved: 2020-07-08

## 2020-07-08 LAB
ALBUMIN SERPL BCP-MCNC: 4.2 G/DL (ref 3.5–5.2)
ALP SERPL-CCNC: 48 U/L (ref 55–135)
ALT SERPL W/O P-5'-P-CCNC: 44 U/L (ref 10–44)
ANION GAP SERPL CALC-SCNC: 8 MMOL/L (ref 8–16)
AST SERPL-CCNC: 29 U/L (ref 10–40)
BASOPHILS # BLD AUTO: 0.02 K/UL (ref 0–0.2)
BASOPHILS NFR BLD: 0.4 % (ref 0–1.9)
BILIRUB SERPL-MCNC: 0.4 MG/DL (ref 0.1–1)
BUN SERPL-MCNC: 22 MG/DL (ref 6–20)
CALCIUM SERPL-MCNC: 8.8 MG/DL (ref 8.7–10.5)
CHLORIDE SERPL-SCNC: 105 MMOL/L (ref 95–110)
CO2 SERPL-SCNC: 23 MMOL/L (ref 23–29)
CREAT SERPL-MCNC: 1.3 MG/DL (ref 0.5–1.4)
DIFFERENTIAL METHOD: ABNORMAL
EOSINOPHIL # BLD AUTO: 0.3 K/UL (ref 0–0.5)
EOSINOPHIL NFR BLD: 5.9 % (ref 0–8)
ERYTHROCYTE [DISTWIDTH] IN BLOOD BY AUTOMATED COUNT: 13.7 % (ref 11.5–14.5)
EST. GFR  (AFRICAN AMERICAN): >60 ML/MIN/1.73 M^2
EST. GFR  (NON AFRICAN AMERICAN): >60 ML/MIN/1.73 M^2
GLUCOSE SERPL-MCNC: 138 MG/DL (ref 70–110)
HCT VFR BLD AUTO: 42 % (ref 40–54)
HGB BLD-MCNC: 12.3 G/DL (ref 14–18)
IMM GRANULOCYTES # BLD AUTO: 0.01 K/UL (ref 0–0.04)
IMM GRANULOCYTES NFR BLD AUTO: 0.2 % (ref 0–0.5)
LYMPHOCYTES # BLD AUTO: 1.7 K/UL (ref 1–4.8)
LYMPHOCYTES NFR BLD: 35.9 % (ref 18–48)
MCH RBC QN AUTO: 26 PG (ref 27–31)
MCHC RBC AUTO-ENTMCNC: 29.3 G/DL (ref 32–36)
MCV RBC AUTO: 89 FL (ref 82–98)
MONOCYTES # BLD AUTO: 0.4 K/UL (ref 0.3–1)
MONOCYTES NFR BLD: 8.2 % (ref 4–15)
NEUTROPHILS # BLD AUTO: 2.3 K/UL (ref 1.8–7.7)
NEUTROPHILS NFR BLD: 49.4 % (ref 38–73)
NRBC BLD-RTO: 0 /100 WBC
PLATELET # BLD AUTO: 123 K/UL (ref 150–350)
PMV BLD AUTO: 11.7 FL (ref 9.2–12.9)
POTASSIUM SERPL-SCNC: 4.4 MMOL/L (ref 3.5–5.1)
PROT SERPL-MCNC: 7.2 G/DL (ref 6–8.4)
RBC # BLD AUTO: 4.73 M/UL (ref 4.6–6.2)
SODIUM SERPL-SCNC: 136 MMOL/L (ref 136–145)
URATE SERPL-MCNC: 8.2 MG/DL (ref 3.4–7)
WBC # BLD AUTO: 4.73 K/UL (ref 3.9–12.7)

## 2020-07-08 PROCEDURE — 85025 COMPLETE CBC W/AUTO DIFF WBC: CPT

## 2020-07-08 PROCEDURE — 80053 COMPREHEN METABOLIC PANEL: CPT

## 2020-07-08 PROCEDURE — 36415 COLL VENOUS BLD VENIPUNCTURE: CPT | Mod: PO

## 2020-07-08 PROCEDURE — 93005 ELECTROCARDIOGRAM TRACING: CPT | Mod: S$GLB,,, | Performed by: INTERNAL MEDICINE

## 2020-07-08 PROCEDURE — 99214 PR OFFICE/OUTPT VISIT, EST, LEVL IV, 30-39 MIN: ICD-10-PCS | Mod: S$GLB,,, | Performed by: INTERNAL MEDICINE

## 2020-07-08 PROCEDURE — 84550 ASSAY OF BLOOD/URIC ACID: CPT

## 2020-07-08 PROCEDURE — 99999 PR PBB SHADOW E&M-EST. PATIENT-LVL III: CPT | Mod: PBBFAC,,, | Performed by: INTERNAL MEDICINE

## 2020-07-08 PROCEDURE — 93010 EKG 12-LEAD: ICD-10-PCS | Mod: S$GLB,,, | Performed by: INTERNAL MEDICINE

## 2020-07-08 PROCEDURE — 99999 PR PBB SHADOW E&M-EST. PATIENT-LVL III: ICD-10-PCS | Mod: PBBFAC,,, | Performed by: INTERNAL MEDICINE

## 2020-07-08 PROCEDURE — 93010 ELECTROCARDIOGRAM REPORT: CPT | Mod: S$GLB,,, | Performed by: INTERNAL MEDICINE

## 2020-07-08 PROCEDURE — 99214 OFFICE O/P EST MOD 30 MIN: CPT | Mod: S$GLB,,, | Performed by: INTERNAL MEDICINE

## 2020-07-08 PROCEDURE — 93005 EKG 12-LEAD: ICD-10-PCS | Mod: S$GLB,,, | Performed by: INTERNAL MEDICINE

## 2020-07-08 NOTE — PROGRESS NOTES
Health Maintenance Due   Topic Date Due    Foot Exam      Eye Exam      HIV Screening      Pneumococcal Vaccine (Medium Risk) (1 of 1 - PPSV23)     Low Dose Statin      Urine Microalbumin         Pt will do health maintenance after surgery

## 2020-07-14 ENCOUNTER — TELEPHONE (OUTPATIENT)
Dept: FAMILY MEDICINE | Facility: CLINIC | Age: 50
End: 2020-07-14

## 2020-07-14 NOTE — TELEPHONE ENCOUNTER
Spoke with patient and notified results as noted below by provider, patient verbalized understanding.

## 2020-07-14 NOTE — TELEPHONE ENCOUNTER
----- Message from Sadie Figueroa MD sent at 7/10/2020  5:00 PM CDT -----  Possible to tell pt that his blood work came back fine, his uric acid went from 10.8 to 8.2, which is great in one month!  Clearance for surgery done.   Thanks.

## 2020-07-16 ENCOUNTER — HOSPITAL ENCOUNTER (OUTPATIENT)
Dept: PREADMISSION TESTING | Facility: HOSPITAL | Age: 50
Discharge: HOME | End: 2020-07-16
Attending: ORTHOPAEDIC SURGERY
Payer: COMMERCIAL

## 2020-07-16 ENCOUNTER — TELEPHONE (OUTPATIENT)
Dept: ENDOSCOPY | Facility: HOSPITAL | Age: 50
End: 2020-07-16

## 2020-07-16 VITALS
DIASTOLIC BLOOD PRESSURE: 92 MMHG | WEIGHT: 255.31 LBS | BODY MASS INDEX: 31.09 KG/M2 | TEMPERATURE: 97 F | RESPIRATION RATE: 18 BRPM | HEART RATE: 81 BPM | HEIGHT: 76 IN | SYSTOLIC BLOOD PRESSURE: 153 MMHG

## 2020-07-16 DIAGNOSIS — Z12.11 SCREENING FOR COLON CANCER: Primary | ICD-10-CM

## 2020-07-16 NOTE — DISCHARGE INSTRUCTIONS
Your procedure  is scheduled for _7/22/2020_________.    Call [phone] between 2pm and 5pm on _7/21/2020______to find out your arrival time for the day of surgery.    Report to the Emergency Department on the day of your surgery.  You will be escorted to the admitting unit.    Important instructions:   Do not eat or drink after 12 midnight, including water.  It is okay to brush your teeth.  Do not have gum, candy or mints.     Take only these medications with a small swallow of water on the morning of your surgery __none____________    Do not take any diabetic medication on the morning of surgery unless instructed to do so by your doctor or pre op nurse.    Stop taking Aspirin, Ibuprofen, Motrin and Aleve , Fish oil, and Vitamin E for at least 7 days before your surgery. You may use Tylenol unless otherwise instructed by your doctor.         Please shower the night before and the morning of your surgery.        Use Hibiclens soap as instructed by your pre op nurse.   Please place clean linens on your bed the night before surgery. Please wear fresh clean clothing after each shower.     No shaving of procedural area at least 4-5 days before surgery due to increased risk of skin irritation and/or possible infection.     You may wear deodorant only.      Do not wear powder, body lotion or perfume/cologne.     Do not wear any jewelry or have any metal on your body.     You will be asked to remove any dentures or partials for the procedure.     Please bring any documents given to you by your doctor.     If you are going home on the same day of surgery, you must arrange for a family member or a friend to drive you home.  Public transportation is prohibited.  You will not be able to drive home if you were given anesthesia or sedation.    Wear loose fitting clothes allowing for bandages.     Please leave money and valuables home.       You may bring your cell phone.     Call the doctor if fever or illness should  occur before your surgery.    Call [phone] to contact us here if needed.

## 2020-07-20 ENCOUNTER — HOSPITAL ENCOUNTER (OUTPATIENT)
Dept: PREADMISSION TESTING | Facility: HOSPITAL | Age: 50
Discharge: HOME | End: 2020-07-20
Attending: ORTHOPAEDIC SURGERY
Payer: COMMERCIAL

## 2020-07-20 DIAGNOSIS — Z01.818 PREOP TESTING: ICD-10-CM

## 2020-07-20 LAB — SARS-COV-2 RDRP RESP QL NAA+PROBE: NEGATIVE

## 2020-07-20 PROCEDURE — U0002 COVID-19 LAB TEST NON-CDC: HCPCS

## 2020-07-21 ENCOUNTER — ANESTHESIA EVENT (OUTPATIENT)
Dept: SURGERY | Facility: HOSPITAL | Age: 50
End: 2020-07-21
Payer: COMMERCIAL

## 2020-07-22 ENCOUNTER — ANESTHESIA (OUTPATIENT)
Dept: SURGERY | Facility: HOSPITAL | Age: 50
End: 2020-07-22
Payer: COMMERCIAL

## 2020-07-22 ENCOUNTER — HOSPITAL ENCOUNTER (OUTPATIENT)
Facility: HOSPITAL | Age: 50
Discharge: HOME | End: 2020-07-22
Attending: ORTHOPAEDIC SURGERY | Admitting: ORTHOPAEDIC SURGERY
Payer: COMMERCIAL

## 2020-07-22 VITALS
SYSTOLIC BLOOD PRESSURE: 137 MMHG | OXYGEN SATURATION: 99 % | HEART RATE: 68 BPM | WEIGHT: 255.31 LBS | DIASTOLIC BLOOD PRESSURE: 72 MMHG | BODY MASS INDEX: 31.08 KG/M2 | RESPIRATION RATE: 16 BRPM | TEMPERATURE: 98 F

## 2020-07-22 DIAGNOSIS — M70.21 OLECRANON BURSITIS OF RIGHT ELBOW: Primary | ICD-10-CM

## 2020-07-22 DIAGNOSIS — M1A.9XX1 TOPHUS OF RIGHT ELBOW DUE TO GOUT: ICD-10-CM

## 2020-07-22 DIAGNOSIS — Z01.818 PREOP TESTING: Primary | ICD-10-CM

## 2020-07-22 DIAGNOSIS — M70.21 OLECRANON BURSITIS OF RIGHT ELBOW: ICD-10-CM

## 2020-07-22 DIAGNOSIS — E11.65 TYPE 2 DIABETES MELLITUS WITH HYPERGLYCEMIA, WITHOUT LONG-TERM CURRENT USE OF INSULIN: ICD-10-CM

## 2020-07-22 LAB — POCT GLUCOSE: 125 MG/DL (ref 70–110)

## 2020-07-22 PROCEDURE — 37000009 HC ANESTHESIA EA ADD 15 MINS: Performed by: ORTHOPAEDIC SURGERY

## 2020-07-22 PROCEDURE — 63600175 PHARM REV CODE 636 W HCPCS: Performed by: ORTHOPAEDIC SURGERY

## 2020-07-22 PROCEDURE — 25000003 PHARM REV CODE 250: Performed by: ORTHOPAEDIC SURGERY

## 2020-07-22 PROCEDURE — 36000706: Performed by: ORTHOPAEDIC SURGERY

## 2020-07-22 PROCEDURE — 37000008 HC ANESTHESIA 1ST 15 MINUTES: Performed by: ORTHOPAEDIC SURGERY

## 2020-07-22 PROCEDURE — 88305 TISSUE EXAM BY PATHOLOGIST: CPT | Performed by: PATHOLOGY

## 2020-07-22 PROCEDURE — D9220A PRA ANESTHESIA: Mod: CRNA,,, | Performed by: NURSE ANESTHETIST, CERTIFIED REGISTERED

## 2020-07-22 PROCEDURE — 71000039 HC RECOVERY, EACH ADD'L HOUR: Performed by: ORTHOPAEDIC SURGERY

## 2020-07-22 PROCEDURE — 63600175 PHARM REV CODE 636 W HCPCS: Performed by: ANESTHESIOLOGY

## 2020-07-22 PROCEDURE — 24105 PR REMOVAL OF ELBOW BURSA: ICD-10-PCS | Mod: RT,,, | Performed by: ORTHOPAEDIC SURGERY

## 2020-07-22 PROCEDURE — D9220A PRA ANESTHESIA: ICD-10-PCS | Mod: CRNA,,, | Performed by: NURSE ANESTHETIST, CERTIFIED REGISTERED

## 2020-07-22 PROCEDURE — 24105 EXCISION OLECRANON BURSA: CPT | Mod: RT,,, | Performed by: ORTHOPAEDIC SURGERY

## 2020-07-22 PROCEDURE — 36000707: Performed by: ORTHOPAEDIC SURGERY

## 2020-07-22 PROCEDURE — 71000015 HC POSTOP RECOV 1ST HR: Performed by: ORTHOPAEDIC SURGERY

## 2020-07-22 PROCEDURE — D9220A PRA ANESTHESIA: Mod: ANES,,, | Performed by: ANESTHESIOLOGY

## 2020-07-22 PROCEDURE — 88305 TISSUE EXAM BY PATHOLOGIST: CPT | Mod: 26,,, | Performed by: PATHOLOGY

## 2020-07-22 PROCEDURE — 63600175 PHARM REV CODE 636 W HCPCS: Performed by: NURSE ANESTHETIST, CERTIFIED REGISTERED

## 2020-07-22 PROCEDURE — 88305 TISSUE EXAM BY PATHOLOGIST: ICD-10-PCS | Mod: 26,,, | Performed by: PATHOLOGY

## 2020-07-22 PROCEDURE — 71000016 HC POSTOP RECOV ADDL HR: Performed by: ORTHOPAEDIC SURGERY

## 2020-07-22 PROCEDURE — D9220A PRA ANESTHESIA: ICD-10-PCS | Mod: ANES,,, | Performed by: ANESTHESIOLOGY

## 2020-07-22 PROCEDURE — 71000033 HC RECOVERY, INTIAL HOUR: Performed by: ORTHOPAEDIC SURGERY

## 2020-07-22 PROCEDURE — 25000003 PHARM REV CODE 250: Performed by: NURSE ANESTHETIST, CERTIFIED REGISTERED

## 2020-07-22 RX ORDER — FENTANYL CITRATE 50 UG/ML
INJECTION, SOLUTION INTRAMUSCULAR; INTRAVENOUS
Status: DISCONTINUED | OUTPATIENT
Start: 2020-07-22 | End: 2020-07-22

## 2020-07-22 RX ORDER — LIDOCAINE HYDROCHLORIDE 10 MG/ML
1 INJECTION, SOLUTION EPIDURAL; INFILTRATION; INTRACAUDAL; PERINEURAL ONCE
Status: DISCONTINUED | OUTPATIENT
Start: 2020-07-22 | End: 2020-07-22 | Stop reason: HOSPADM

## 2020-07-22 RX ORDER — ROCURONIUM BROMIDE 10 MG/ML
INJECTION, SOLUTION INTRAVENOUS
Status: DISCONTINUED | OUTPATIENT
Start: 2020-07-22 | End: 2020-07-22

## 2020-07-22 RX ORDER — NEOSTIGMINE METHYLSULFATE 1 MG/ML
INJECTION, SOLUTION INTRAVENOUS
Status: DISCONTINUED | OUTPATIENT
Start: 2020-07-22 | End: 2020-07-22

## 2020-07-22 RX ORDER — BUPIVACAINE HYDROCHLORIDE 2.5 MG/ML
INJECTION, SOLUTION EPIDURAL; INFILTRATION; INTRACAUDAL
Status: DISCONTINUED | OUTPATIENT
Start: 2020-07-22 | End: 2020-07-22 | Stop reason: HOSPADM

## 2020-07-22 RX ORDER — ACETAMINOPHEN 10 MG/ML
1000 INJECTION, SOLUTION INTRAVENOUS ONCE
Status: COMPLETED | OUTPATIENT
Start: 2020-07-22 | End: 2020-07-22

## 2020-07-22 RX ORDER — ONDANSETRON 2 MG/ML
INJECTION INTRAMUSCULAR; INTRAVENOUS
Status: DISCONTINUED | OUTPATIENT
Start: 2020-07-22 | End: 2020-07-22

## 2020-07-22 RX ORDER — MIDAZOLAM HYDROCHLORIDE 1 MG/ML
INJECTION, SOLUTION INTRAMUSCULAR; INTRAVENOUS
Status: DISCONTINUED | OUTPATIENT
Start: 2020-07-22 | End: 2020-07-22

## 2020-07-22 RX ORDER — SUCCINYLCHOLINE CHLORIDE 20 MG/ML
INJECTION INTRAMUSCULAR; INTRAVENOUS
Status: DISCONTINUED | OUTPATIENT
Start: 2020-07-22 | End: 2020-07-22

## 2020-07-22 RX ORDER — GLYCOPYRROLATE 0.2 MG/ML
INJECTION INTRAMUSCULAR; INTRAVENOUS
Status: DISCONTINUED | OUTPATIENT
Start: 2020-07-22 | End: 2020-07-22

## 2020-07-22 RX ORDER — ONDANSETRON 2 MG/ML
4 INJECTION INTRAMUSCULAR; INTRAVENOUS DAILY PRN
Status: DISCONTINUED | OUTPATIENT
Start: 2020-07-22 | End: 2020-07-22 | Stop reason: HOSPADM

## 2020-07-22 RX ORDER — HYDROCODONE BITARTRATE AND ACETAMINOPHEN 10; 325 MG/1; MG/1
1 TABLET ORAL EVERY 6 HOURS PRN
Qty: 20 TABLET | Refills: 0 | Status: SHIPPED | OUTPATIENT
Start: 2020-07-22 | End: 2022-07-29

## 2020-07-22 RX ORDER — SODIUM CHLORIDE, SODIUM LACTATE, POTASSIUM CHLORIDE, CALCIUM CHLORIDE 600; 310; 30; 20 MG/100ML; MG/100ML; MG/100ML; MG/100ML
INJECTION, SOLUTION INTRAVENOUS CONTINUOUS
Status: DISCONTINUED | OUTPATIENT
Start: 2020-07-22 | End: 2020-07-22 | Stop reason: HOSPADM

## 2020-07-22 RX ORDER — CEFAZOLIN SODIUM 2 G/50ML
2 SOLUTION INTRAVENOUS
Status: COMPLETED | OUTPATIENT
Start: 2020-07-22 | End: 2020-07-22

## 2020-07-22 RX ORDER — PROPOFOL 10 MG/ML
VIAL (ML) INTRAVENOUS
Status: DISCONTINUED | OUTPATIENT
Start: 2020-07-22 | End: 2020-07-22

## 2020-07-22 RX ORDER — ONDANSETRON 4 MG/1
4 TABLET, ORALLY DISINTEGRATING ORAL EVERY 8 HOURS PRN
Qty: 10 TABLET | Refills: 0 | Status: SHIPPED | OUTPATIENT
Start: 2020-07-22 | End: 2022-07-29

## 2020-07-22 RX ORDER — LIDOCAINE HYDROCHLORIDE 20 MG/ML
INJECTION INTRAVENOUS
Status: DISCONTINUED | OUTPATIENT
Start: 2020-07-22 | End: 2020-07-22

## 2020-07-22 RX ORDER — SODIUM CHLORIDE 0.9 % (FLUSH) 0.9 %
10 SYRINGE (ML) INJECTION
Status: DISCONTINUED | OUTPATIENT
Start: 2020-07-22 | End: 2020-07-22 | Stop reason: HOSPADM

## 2020-07-22 RX ORDER — HYDROMORPHONE HYDROCHLORIDE 2 MG/ML
0.2 INJECTION, SOLUTION INTRAMUSCULAR; INTRAVENOUS; SUBCUTANEOUS EVERY 5 MIN PRN
Status: DISCONTINUED | OUTPATIENT
Start: 2020-07-22 | End: 2020-07-22 | Stop reason: HOSPADM

## 2020-07-22 RX ORDER — HYDROCODONE BITARTRATE AND ACETAMINOPHEN 10; 325 MG/1; MG/1
1 TABLET ORAL EVERY 6 HOURS PRN
Status: DISCONTINUED | OUTPATIENT
Start: 2020-07-22 | End: 2020-07-22 | Stop reason: HOSPADM

## 2020-07-22 RX ADMIN — PROPOFOL 200 MG: 10 INJECTION, EMULSION INTRAVENOUS at 08:07

## 2020-07-22 RX ADMIN — FENTANYL CITRATE 50 MCG: 50 INJECTION INTRAMUSCULAR; INTRAVENOUS at 09:07

## 2020-07-22 RX ADMIN — HYDROCODONE BITARTRATE AND ACETAMINOPHEN 1 TABLET: 10; 325 TABLET ORAL at 12:07

## 2020-07-22 RX ADMIN — Medication 100 MG: at 08:07

## 2020-07-22 RX ADMIN — GLYCOPYRROLATE 0.6 MG: 0.2 INJECTION, SOLUTION INTRAMUSCULAR; INTRAVENOUS at 09:07

## 2020-07-22 RX ADMIN — ROCURONIUM BROMIDE 20 MG: 10 INJECTION, SOLUTION INTRAVENOUS at 08:07

## 2020-07-22 RX ADMIN — FENTANYL CITRATE 25 MCG: 50 INJECTION INTRAMUSCULAR; INTRAVENOUS at 08:07

## 2020-07-22 RX ADMIN — NEOSTIGMINE METHYLSULFATE 5 MG: 1 INJECTION INTRAVENOUS at 09:07

## 2020-07-22 RX ADMIN — ONDANSETRON 4 MG: 2 INJECTION, SOLUTION INTRAMUSCULAR; INTRAVENOUS at 08:07

## 2020-07-22 RX ADMIN — CEFAZOLIN SODIUM 2 G: 2 SOLUTION INTRAVENOUS at 08:07

## 2020-07-22 RX ADMIN — PROPOFOL 80 MG: 10 INJECTION, EMULSION INTRAVENOUS at 09:07

## 2020-07-22 RX ADMIN — FENTANYL CITRATE 75 MCG: 50 INJECTION INTRAMUSCULAR; INTRAVENOUS at 08:07

## 2020-07-22 RX ADMIN — MIDAZOLAM HYDROCHLORIDE 2 MG: 1 INJECTION, SOLUTION INTRAMUSCULAR; INTRAVENOUS at 08:07

## 2020-07-22 RX ADMIN — SODIUM CHLORIDE, SODIUM LACTATE, POTASSIUM CHLORIDE, AND CALCIUM CHLORIDE: .6; .31; .03; .02 INJECTION, SOLUTION INTRAVENOUS at 07:07

## 2020-07-22 RX ADMIN — SUCCINYLCHOLINE CHLORIDE 120 MG: 20 INJECTION, SOLUTION INTRAMUSCULAR; INTRAVENOUS at 08:07

## 2020-07-22 RX ADMIN — ROCURONIUM BROMIDE 5 MG: 10 INJECTION, SOLUTION INTRAVENOUS at 08:07

## 2020-07-22 RX ADMIN — ACETAMINOPHEN 1000 MG: 10 INJECTION, SOLUTION INTRAVENOUS at 10:07

## 2020-07-22 NOTE — INTERVAL H&P NOTE
The patient has been examined and the H&P has been reviewed:    I concur with the findings and no changes have occurred since H&P was written.   I personally examined this patient in clinic and explained risks and benefits of surgical excision of his right olecranon gouty tophus     Anesthesia/Surgery risks, benefits and alternative options discussed and understood by patient/family.          There are no hospital problems to display for this patient.

## 2020-07-22 NOTE — ANESTHESIA POSTPROCEDURE EVALUATION
Anesthesia Post Evaluation    Patient: Parminder Gregorio    Procedure(s) Performed: Procedure(s) (LRB):  BURSECTOMY, OLECRANON  (Right)    Final Anesthesia Type: general    Patient location during evaluation: PACU  Patient participation: Yes- Able to Participate  Level of consciousness: awake and alert and oriented  Post-procedure vital signs: reviewed and stable  Pain management: adequate  Airway patency: patent    PONV status at discharge: No PONV  Anesthetic complications: no      Cardiovascular status: blood pressure returned to baseline, hemodynamically stable and stable  Respiratory status: unassisted, spontaneous ventilation and room air  Hydration status: euvolemic  Follow-up not needed.          Vitals Value Taken Time   /65 07/22/20 1101   Temp 36.6 °C (97.9 °F) 07/22/20 0943   Pulse 64 07/22/20 1107   Resp 41 07/22/20 1107   SpO2 96 % 07/22/20 1107   Vitals shown include unvalidated device data.      No case tracking events are documented in the log.      Pain/Mary Score: Pain Rating Prior to Med Admin: 3 (7/22/2020 10:44 AM)  Mary Score: 10 (7/22/2020 10:27 AM)

## 2020-07-22 NOTE — BRIEF OP NOTE
[facility]  Brief Operative Note    Surgery Date: 7/22/2020     Surgeon(s) and Role:     * Bruna Dai MD - Primary    Assisting Surgeon: None    Pre-op Diagnosis:  Olecranon bursitis of right elbow [M70.21]    Post-op Diagnosis:  Post-Op Diagnosis Codes:     * Olecranon bursitis of right elbow [M70.21]    Procedure(s) (LRB):  BURSECTOMY, OLECRANON  (Right)    Anesthesia: General    Description of the findings of the procedure(s): gout tophi, olecranon bursitis    Estimated Blood Loss: * No values recorded between 7/22/2020  8:54 AM and 7/22/2020  9:36 AM *         Specimens:   Specimen (12h ago, onward)    None            Discharge Note    OUTCOME: Patient tolerated treatment/procedure well without complication and is now ready for discharge.    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

## 2020-07-22 NOTE — ANESTHESIA PREPROCEDURE EVALUATION
07/22/2020    Pre-operative evaluation for Procedure(s) (LRB):  BURSECTOMY, OLECRANON  (Right)    Parminder Gregorio is a 49 y.o. 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      Current Outpatient Medications on File Prior to Encounter   Medication Sig Dispense Refill    allopurinoL (ZYLOPRIM) 100 MG tablet Take 1 tablet (100 mg total) by mouth once daily. 90 tablet 3    meloxicam (MOBIC) 15 MG tablet Take 1 tablet (15 mg total) by mouth daily as needed for Pain. On a full stomach 30 tablet 0    metFORMIN (GLUCOPHAGE) 500 MG tablet Take 1 tablet (500 mg total) by mouth once daily. In the evening on a full stomach 90 tablet 0       Past Surgical History:   Procedure Laterality Date    LEG SURGERY           Anesthesia Evaluation    I have reviewed the Patient Summary Reports.   I have reviewed the NPO Status.      Review of Systems  Anesthesia Hx:  No problems with previous Anesthesia    Social:  Non-Smoker, No Alcohol Use    Hematology/Oncology:  Hematology Normal   Oncology Normal     EENT/Dental:EENT/Dental Normal   Cardiovascular:   Hypertension    Pulmonary:  Pulmonary Normal    Renal/:  Renal/ Normal     Hepatic/GI:  Hepatic/GI Normal    Musculoskeletal:   Arthritis     Neurological:  Neurology Normal    Endocrine:   Diabetes, type 2        Physical Exam  General:  Well nourished    Airway/Jaw/Neck:  Airway Findings: Mouth Opening: Normal General Airway Assessment: Adult   Mallampati: II  TM Distance: Normal, at least 6 cm  Jaw/Neck Findings:  Neck ROM: Normal ROM      Dental:  Dental Findings: In tact   Chest/Lungs:  Chest/Lungs Findings: Normal Respiratory Rate     Heart/Vascular:  Heart Findings: Rate: Normal        Mental Status:  Mental Status Findings:         Anesthesia Plan  Type of Anesthesia, risks & benefits discussed:  Anesthesia Type:  general  Patient's Preference:   Intra-op Monitoring Plan: standard ASA monitors  Intra-op Monitoring Plan Comments:   Post Op Pain Control Plan:   Post Op Pain Control Plan Comments:   Induction:   IV  Beta Blocker:  Patient is not currently on a Beta-Blocker (No further documentation required).       Informed Consent: Patient understands risks and agrees with Anesthesia plan.  Questions answered. Anesthesia consent signed with patient.  ASA Score: 2     Day of Surgery Review of History & Physical: I have interviewed and examined the patient. I have reviewed the patient's H&P dated:  There are no significant changes.          Ready For Surgery From Anesthesia Perspective.

## 2020-07-22 NOTE — OP NOTE
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  It is painful and he is also bothered by the cosmetic deformity.  He desired excision.  We discussed risks and benefits he elected to proceed and right.    Procedure in detail:  The patient was identified and marked in same day surgery.  He was brought to the operating room, underwent general anesthesia and was placed prone on the operating table.  The arm was placed into an arm luna.. All bony promineces were padded.  The left upper extremity was carefully positioned on an arm board.  The right upper extremity was prepped and draped in the normal sterile fashion.  A time out was called confirming the correct site, side, patient procedure and that antibiotics had been given.  An incision was made midline over the gouty tophus with a plan to excise the thickened skin over the center of the olecranon.  The bursa was identified along with multiple uric acid deposits.  It was excised in its entirety.  The ulnar nerve was protected throughout the procedure.  Intended procedure the tourniquet was let down.  Hemostasis was achieved with the bipolar device.  The wound was copiously irrigated and closed with 3 O Vicryl and nylon. Sterile dressings were applied the patient was awoken from anesthesia.  He was brought to her room without complication.    All counts were correct at the end of the procedure.     Post operative plan:  Sutures will be  removed at 2 weeks  Sling for comfort  No lifting with the right upper extremity

## 2020-07-22 NOTE — TRANSFER OF CARE
Anesthesia Transfer of Care Note    Patient: Parminder Gregorio    Procedure(s) Performed: Procedure(s) (LRB):  BURSECTOMY, OLECRANON  (Right)    Patient location: PACU    Anesthesia Type: general    Transport from OR: Transported from OR on room air with adequate spontaneous ventilation    Post pain: adequate analgesia    Post assessment: no apparent anesthetic complications and tolerated procedure well    Post vital signs: stable    Level of consciousness: awake and alert    Nausea/Vomiting: no nausea/vomiting    Complications: none    Transfer of care protocol was followed      Last vitals:   Visit Vitals  BP (!) 140/73 (BP Location: Left arm, Patient Position: Lying)   Pulse 79   Temp 36.6 °C (97.9 °F) (Oral)   Resp 16   Wt 115.8 kg (255 lb 4.7 oz)   SpO2 99%   BMI 31.08 kg/m²

## 2020-07-22 NOTE — DISCHARGE INSTRUCTIONS
No driving, drinking alcohol, operating machinery or appliances, or participating in activities that require good judgement or balance, and no signing legal documents for the next 24 hours after anesthesia.  Do not drive while taking pain medications.

## 2020-07-23 LAB
FINAL PATHOLOGIC DIAGNOSIS: NORMAL
GROSS: NORMAL
MICROSCOPIC EXAM: NORMAL

## 2020-08-07 DIAGNOSIS — E11.9 TYPE 2 DIABETES MELLITUS WITHOUT COMPLICATION, UNSPECIFIED WHETHER LONG TERM INSULIN USE: ICD-10-CM

## 2020-08-10 ENCOUNTER — OFFICE VISIT (OUTPATIENT)
Dept: ORTHOPEDICS | Facility: CLINIC | Age: 50
End: 2020-08-10
Payer: COMMERCIAL

## 2020-08-10 VITALS
SYSTOLIC BLOOD PRESSURE: 140 MMHG | WEIGHT: 249.13 LBS | DIASTOLIC BLOOD PRESSURE: 68 MMHG | OXYGEN SATURATION: 98 % | HEART RATE: 84 BPM | HEIGHT: 76 IN | RESPIRATION RATE: 18 BRPM | BODY MASS INDEX: 30.34 KG/M2

## 2020-08-10 DIAGNOSIS — M70.21 OLECRANON BURSITIS OF RIGHT ELBOW: Primary | ICD-10-CM

## 2020-08-10 PROCEDURE — 99999 PR PBB SHADOW E&M-EST. PATIENT-LVL III: ICD-10-PCS | Mod: PBBFAC,,, | Performed by: ORTHOPAEDIC SURGERY

## 2020-08-10 PROCEDURE — 99024 POSTOP FOLLOW-UP VISIT: CPT | Mod: S$GLB,,, | Performed by: ORTHOPAEDIC SURGERY

## 2020-08-10 PROCEDURE — 99024 PR POST-OP FOLLOW-UP VISIT: ICD-10-PCS | Mod: S$GLB,,, | Performed by: ORTHOPAEDIC SURGERY

## 2020-08-10 PROCEDURE — 99999 PR PBB SHADOW E&M-EST. PATIENT-LVL III: CPT | Mod: PBBFAC,,, | Performed by: ORTHOPAEDIC SURGERY

## 2020-08-10 NOTE — PROGRESS NOTES
Postoperative Visit    History of Present Illness:   Parminder is 2  weeks s/p right olecranon bursitis   (DOS-7/22/2020)  He is doing well -- pain is well controlled and She is not continuing to take narcotic pain medications   He is compliant with activity restrictions and is wearing his surgical dressing  Denies fevers, chills, constitutional symptoms   No n/t    Physical Examination:    NAD  right upper extremity:  Incision over the elbow is healing well with suture in place   No erythema, drainageor other signs of infection. Appropriate post operative swelling is present  LTSI m/u/r  2+ RP  + EPL, IO, FDS, FDP         Assessment/Plan:  49 y.o. male  2  weeks s/p right olecranon bursitis   (DOS-7/22/2020)    Plan  1. Sutures were removed today and steri strips were placed   2. Activity as tolerated  3. RTC PRN  4. Has some symptoms concerning for UTI - recommended that he discuss today w his PCP    All questions were answered in detail. The patient is in full agreement with the treatment plan and will proceed accordingly.

## 2020-08-14 ENCOUNTER — OFFICE VISIT (OUTPATIENT)
Dept: FAMILY MEDICINE | Facility: CLINIC | Age: 50
End: 2020-08-14
Payer: COMMERCIAL

## 2020-08-14 VITALS
WEIGHT: 250 LBS | TEMPERATURE: 98 F | HEIGHT: 76 IN | SYSTOLIC BLOOD PRESSURE: 138 MMHG | HEART RATE: 74 BPM | BODY MASS INDEX: 30.44 KG/M2 | OXYGEN SATURATION: 98 % | DIASTOLIC BLOOD PRESSURE: 84 MMHG

## 2020-08-14 DIAGNOSIS — R35.0 URINARY FREQUENCY: Primary | ICD-10-CM

## 2020-08-14 DIAGNOSIS — R31.9 HEMATURIA, UNSPECIFIED TYPE: ICD-10-CM

## 2020-08-14 DIAGNOSIS — R30.0 DYSURIA: ICD-10-CM

## 2020-08-14 DIAGNOSIS — N30.01 ACUTE CYSTITIS WITH HEMATURIA: ICD-10-CM

## 2020-08-14 DIAGNOSIS — Z11.59 NEED FOR HEPATITIS C SCREENING TEST: ICD-10-CM

## 2020-08-14 LAB
BACTERIA #/AREA URNS AUTO: ABNORMAL /HPF
BILIRUB SERPL-MCNC: ABNORMAL MG/DL
BILIRUB UR QL STRIP: NEGATIVE
BLOOD URINE, POC: ABNORMAL
CLARITY UR REFRACT.AUTO: CLEAR
CLARITY, POC UA: ABNORMAL
COLOR UR AUTO: YELLOW
COLOR, POC UA: YELLOW
GLUCOSE UR QL STRIP: NEGATIVE
GLUCOSE UR QL STRIP: NORMAL
HGB UR QL STRIP: ABNORMAL
KETONES UR QL STRIP: ABNORMAL
KETONES UR QL STRIP: NEGATIVE
LEUKOCYTE ESTERASE UR QL STRIP: ABNORMAL
LEUKOCYTE ESTERASE URINE, POC: ABNORMAL
MICROSCOPIC COMMENT: ABNORMAL
NITRITE UR QL STRIP: NEGATIVE
NITRITE, POC UA: ABNORMAL
PH UR STRIP: 6 [PH] (ref 5–8)
PH, POC UA: 5
PROT UR QL STRIP: NEGATIVE
PROTEIN, POC: ABNORMAL
RBC #/AREA URNS AUTO: 2 /HPF (ref 0–4)
SP GR UR STRIP: 1.01 (ref 1–1.03)
SPECIFIC GRAVITY, POC UA: 1.01
SQUAMOUS #/AREA URNS AUTO: 0 /HPF
URN SPEC COLLECT METH UR: ABNORMAL
UROBILINOGEN, POC UA: NORMAL
WBC #/AREA URNS AUTO: 6 /HPF (ref 0–5)

## 2020-08-14 PROCEDURE — 81002 URINALYSIS NONAUTO W/O SCOPE: CPT | Mod: S$GLB,,, | Performed by: INTERNAL MEDICINE

## 2020-08-14 PROCEDURE — 99214 PR OFFICE/OUTPT VISIT, EST, LEVL IV, 30-39 MIN: ICD-10-PCS | Mod: 25,S$GLB,, | Performed by: INTERNAL MEDICINE

## 2020-08-14 PROCEDURE — 99999 PR PBB SHADOW E&M-EST. PATIENT-LVL III: CPT | Mod: PBBFAC,,, | Performed by: INTERNAL MEDICINE

## 2020-08-14 PROCEDURE — 99999 PR PBB SHADOW E&M-EST. PATIENT-LVL III: ICD-10-PCS | Mod: PBBFAC,,, | Performed by: INTERNAL MEDICINE

## 2020-08-14 PROCEDURE — 81001 URINALYSIS AUTO W/SCOPE: CPT

## 2020-08-14 PROCEDURE — 81002 POCT URINE DIPSTICK WITHOUT MICROSCOPE: ICD-10-PCS | Mod: S$GLB,,, | Performed by: INTERNAL MEDICINE

## 2020-08-14 PROCEDURE — 99214 OFFICE O/P EST MOD 30 MIN: CPT | Mod: 25,S$GLB,, | Performed by: INTERNAL MEDICINE

## 2020-08-14 RX ORDER — SULFAMETHOXAZOLE AND TRIMETHOPRIM 800; 160 MG/1; MG/1
1 TABLET ORAL 2 TIMES DAILY
Qty: 14 TABLET | Refills: 0 | Status: SHIPPED | OUTPATIENT
Start: 2020-08-14 | End: 2020-08-21

## 2020-08-14 NOTE — PROGRESS NOTES
Subjective:       Patient ID: Parminder Gregorio is a pleasant 49 y.o. Black or  male patient    Chief Complaint: Abdominal Pain      Patient is a pt I saw last on  07/08/2020. See my last notes and the list of problems below.    HPI     He reports that from last Saturday, he has been presenting with urinary frequency and painful urination. He had some blood at the end of the miction with some clots the 1st day, then less and less, then clear. However still some dysuria and has to go to the bathroom about each hour.  No fever, no pain in lower back, pain lower abdomen. GI fine.  He took for 4 days clindamycin he had left from dental procedure.       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  Documented    Review of Systems   Constitutional: Negative.    Respiratory: Negative.    Cardiovascular: Negative.    Genitourinary: Positive for dysuria, frequency, hematuria and urgency.       Objective:      Physical Exam  Vitals signs and nursing note reviewed.   Constitutional:       Appearance: Normal appearance.   Cardiovascular:      Rate and Rhythm: Normal rate and regular rhythm.      Pulses: Normal pulses.      Heart sounds: Normal heart sounds.   Pulmonary:      Effort: Pulmonary effort is normal.      Breath sounds: Normal breath sounds.   Abdominal:      General: Abdomen is flat.      Palpations: Abdomen is soft.      Tenderness: There is  "no abdominal tenderness.   Neurological:      Mental Status: He is alert.         Vitals:    08/14/20 1453   BP: 138/84   Pulse: 74   Temp: 97.8 °F (36.6 °C)   TempSrc: Oral   SpO2: 98%   Weight: 113.4 kg (250 lb)   Height: 6' 4" (1.93 m)     Body mass index is 30.43 kg/m².    RESULTS: Reviewed labs from last 12 months    Assessment:       1. Urinary frequency    2. Dysuria    3. Acute cystitis with hematuria        Plan:   Parminder was seen today for abdominal pain.    Diagnoses and all orders for this visit:    Urinary frequency  -     POCT URINE DIPSTICK WITHOUT MICROSCOPE  -     Urinalysis, Reflex to Urine Culture Urine, Clean Catch    See below.    Dysuria  -     POCT URINE DIPSTICK WITHOUT MICROSCOPE  -     Urinalysis, Reflex to Urine Culture Urine, Clean Catch    See below.    Acute cystitis with hematuria  -     sulfamethoxazole-trimethoprim 800-160mg (BACTRIM DS) 800-160 mg Tab; Take 1 tablet by mouth 2 (two) times daily. for 7 days    First episode.  Patient is not concern regarding possible STD.  Dipstick came back probable UTI.  Was sent for UA and culture.  Will start treatment with Bactrim.  Advised patient to monitor symptom that I detailed for him and asked him to seek for medical attention we present with any concern during the weekend.    No follow-ups on file.    This note was created by combination of typed  and M-Modal dictation.  Transcription errors may be present.  If there are any questions, please contact me.    Due to culture negative, Hx of macroscopic hematuria and at UA microscopic hematuria, will refer to Urology. Pt informed through Portal.  "

## 2020-08-31 ENCOUNTER — TELEPHONE (OUTPATIENT)
Dept: FAMILY MEDICINE | Facility: CLINIC | Age: 50
End: 2020-08-31

## 2020-08-31 ENCOUNTER — LAB VISIT (OUTPATIENT)
Dept: LAB | Facility: HOSPITAL | Age: 50
End: 2020-08-31
Attending: INTERNAL MEDICINE
Payer: COMMERCIAL

## 2020-08-31 DIAGNOSIS — I10 HYPERTENSION, UNCONTROLLED: ICD-10-CM

## 2020-08-31 DIAGNOSIS — E11.65 TYPE 2 DIABETES MELLITUS WITH HYPERGLYCEMIA, WITHOUT LONG-TERM CURRENT USE OF INSULIN: ICD-10-CM

## 2020-08-31 LAB
ANION GAP SERPL CALC-SCNC: 9 MMOL/L (ref 8–16)
BUN SERPL-MCNC: 26 MG/DL (ref 6–20)
CALCIUM SERPL-MCNC: 9.6 MG/DL (ref 8.7–10.5)
CHLORIDE SERPL-SCNC: 106 MMOL/L (ref 95–110)
CO2 SERPL-SCNC: 23 MMOL/L (ref 23–29)
CREAT SERPL-MCNC: 1.3 MG/DL (ref 0.5–1.4)
EST. GFR  (AFRICAN AMERICAN): >60 ML/MIN/1.73 M^2
EST. GFR  (NON AFRICAN AMERICAN): >60 ML/MIN/1.73 M^2
ESTIMATED AVG GLUCOSE: 154 MG/DL (ref 68–131)
GLUCOSE SERPL-MCNC: 139 MG/DL (ref 70–110)
HBA1C MFR BLD HPLC: 7 % (ref 4–5.6)
POTASSIUM SERPL-SCNC: 4.4 MMOL/L (ref 3.5–5.1)
SODIUM SERPL-SCNC: 138 MMOL/L (ref 136–145)

## 2020-08-31 PROCEDURE — 36415 COLL VENOUS BLD VENIPUNCTURE: CPT | Mod: PO

## 2020-08-31 PROCEDURE — 80048 BASIC METABOLIC PNL TOTAL CA: CPT

## 2020-08-31 PROCEDURE — 83036 HEMOGLOBIN GLYCOSYLATED A1C: CPT

## 2020-08-31 NOTE — TELEPHONE ENCOUNTER
I spoke with the patient regarding a referral to Urology. Patient refuse to schedule stating he call clinic when available

## 2020-09-08 ENCOUNTER — PATIENT MESSAGE (OUTPATIENT)
Dept: FAMILY MEDICINE | Facility: CLINIC | Age: 50
End: 2020-09-08

## 2020-09-08 DIAGNOSIS — E11.65 TYPE 2 DIABETES MELLITUS WITH HYPERGLYCEMIA, WITHOUT LONG-TERM CURRENT USE OF INSULIN: ICD-10-CM

## 2020-09-08 RX ORDER — METFORMIN HYDROCHLORIDE 500 MG/1
500 TABLET ORAL DAILY
Qty: 90 TABLET | Refills: 0 | Status: SHIPPED | OUTPATIENT
Start: 2020-09-08 | End: 2020-12-18

## 2020-10-05 ENCOUNTER — PATIENT MESSAGE (OUTPATIENT)
Dept: ADMINISTRATIVE | Facility: HOSPITAL | Age: 50
End: 2020-10-05

## 2021-01-04 ENCOUNTER — PATIENT MESSAGE (OUTPATIENT)
Dept: ADMINISTRATIVE | Facility: HOSPITAL | Age: 51
End: 2021-01-04

## 2021-03-17 DIAGNOSIS — E11.9 TYPE 2 DIABETES MELLITUS WITHOUT COMPLICATION: ICD-10-CM

## 2021-04-05 ENCOUNTER — PATIENT MESSAGE (OUTPATIENT)
Dept: ADMINISTRATIVE | Facility: HOSPITAL | Age: 51
End: 2021-04-05

## 2021-04-13 DIAGNOSIS — E11.65 TYPE 2 DIABETES MELLITUS WITH HYPERGLYCEMIA, WITHOUT LONG-TERM CURRENT USE OF INSULIN: ICD-10-CM

## 2021-04-13 DIAGNOSIS — M10.9 GOUT, UNSPECIFIED CAUSE, UNSPECIFIED CHRONICITY, UNSPECIFIED SITE: ICD-10-CM

## 2021-04-13 RX ORDER — ALLOPURINOL 100 MG/1
100 TABLET ORAL DAILY
Qty: 90 TABLET | Refills: 3 | Status: SHIPPED | OUTPATIENT
Start: 2021-04-13 | End: 2022-07-29

## 2021-04-13 RX ORDER — METFORMIN HYDROCHLORIDE 500 MG/1
500 TABLET ORAL
Qty: 90 TABLET | Refills: 0 | Status: SHIPPED | OUTPATIENT
Start: 2021-04-13 | End: 2022-07-29

## 2021-04-14 ENCOUNTER — TELEPHONE (OUTPATIENT)
Dept: FAMILY MEDICINE | Facility: CLINIC | Age: 51
End: 2021-04-14

## 2021-04-14 DIAGNOSIS — E11.65 TYPE 2 DIABETES MELLITUS WITH HYPERGLYCEMIA, WITHOUT LONG-TERM CURRENT USE OF INSULIN: ICD-10-CM

## 2021-04-14 DIAGNOSIS — D69.6 THROMBOPENIA: ICD-10-CM

## 2021-04-14 DIAGNOSIS — I10 HYPERTENSION, UNCONTROLLED: Primary | ICD-10-CM

## 2021-04-14 DIAGNOSIS — M10.9 GOUT, UNSPECIFIED CAUSE, UNSPECIFIED CHRONICITY, UNSPECIFIED SITE: ICD-10-CM

## 2021-07-07 ENCOUNTER — PATIENT MESSAGE (OUTPATIENT)
Dept: ADMINISTRATIVE | Facility: HOSPITAL | Age: 51
End: 2021-07-07

## 2021-08-04 ENCOUNTER — PATIENT MESSAGE (OUTPATIENT)
Dept: ADMINISTRATIVE | Facility: HOSPITAL | Age: 51
End: 2021-08-04

## 2021-10-04 ENCOUNTER — PATIENT MESSAGE (OUTPATIENT)
Dept: ADMINISTRATIVE | Facility: HOSPITAL | Age: 51
End: 2021-10-04

## 2021-10-07 ENCOUNTER — TELEPHONE (OUTPATIENT)
Dept: FAMILY MEDICINE | Facility: CLINIC | Age: 51
End: 2021-10-07

## 2021-10-07 DIAGNOSIS — E11.69 TYPE 2 DIABETES MELLITUS WITH OTHER SPECIFIED COMPLICATION, UNSPECIFIED WHETHER LONG TERM INSULIN USE: Primary | ICD-10-CM

## 2021-12-15 ENCOUNTER — PATIENT MESSAGE (OUTPATIENT)
Dept: ADMINISTRATIVE | Facility: HOSPITAL | Age: 51
End: 2021-12-15

## 2022-03-17 ENCOUNTER — PATIENT MESSAGE (OUTPATIENT)
Dept: ADMINISTRATIVE | Facility: HOSPITAL | Age: 52
End: 2022-03-17
Payer: COMMERCIAL

## 2022-03-17 ENCOUNTER — PATIENT OUTREACH (OUTPATIENT)
Dept: ADMINISTRATIVE | Facility: HOSPITAL | Age: 52
End: 2022-03-17
Payer: COMMERCIAL

## 2022-03-17 NOTE — PROGRESS NOTES
Called patient to follow up  Blood pressure and recent eye exam  Was unable to reach patient-portal message sent

## 2022-05-27 ENCOUNTER — TELEPHONE (OUTPATIENT)
Dept: FAMILY MEDICINE | Facility: CLINIC | Age: 52
End: 2022-05-27
Payer: COMMERCIAL

## 2022-05-30 ENCOUNTER — PATIENT MESSAGE (OUTPATIENT)
Dept: ADMINISTRATIVE | Facility: HOSPITAL | Age: 52
End: 2022-05-30
Payer: COMMERCIAL

## 2022-06-22 ENCOUNTER — TELEPHONE (OUTPATIENT)
Dept: FAMILY MEDICINE | Facility: CLINIC | Age: 52
End: 2022-06-22
Payer: COMMERCIAL

## 2022-06-22 VITALS — SYSTOLIC BLOOD PRESSURE: 138 MMHG | DIASTOLIC BLOOD PRESSURE: 84 MMHG

## 2022-07-29 ENCOUNTER — OFFICE VISIT (OUTPATIENT)
Dept: FAMILY MEDICINE | Facility: CLINIC | Age: 52
End: 2022-07-29
Payer: COMMERCIAL

## 2022-07-29 ENCOUNTER — LAB VISIT (OUTPATIENT)
Dept: LAB | Facility: HOSPITAL | Age: 52
End: 2022-07-29
Attending: INTERNAL MEDICINE
Payer: COMMERCIAL

## 2022-07-29 ENCOUNTER — TELEPHONE (OUTPATIENT)
Dept: FAMILY MEDICINE | Facility: CLINIC | Age: 52
End: 2022-07-29

## 2022-07-29 VITALS
SYSTOLIC BLOOD PRESSURE: 124 MMHG | HEART RATE: 80 BPM | RESPIRATION RATE: 16 BRPM | WEIGHT: 233.94 LBS | HEIGHT: 76 IN | BODY MASS INDEX: 28.49 KG/M2 | OXYGEN SATURATION: 96 % | TEMPERATURE: 99 F | DIASTOLIC BLOOD PRESSURE: 78 MMHG

## 2022-07-29 DIAGNOSIS — Z00.00 ENCOUNTER FOR ANNUAL PHYSICAL EXAM: ICD-10-CM

## 2022-07-29 DIAGNOSIS — E11.65 UNCONTROLLED TYPE 2 DIABETES MELLITUS WITH HYPERGLYCEMIA: Primary | ICD-10-CM

## 2022-07-29 DIAGNOSIS — E55.9 VITAMIN D INSUFFICIENCY: ICD-10-CM

## 2022-07-29 DIAGNOSIS — D69.6 THROMBOPENIA: ICD-10-CM

## 2022-07-29 DIAGNOSIS — R35.0 URINARY FREQUENCY: ICD-10-CM

## 2022-07-29 DIAGNOSIS — M10.9 GOUT, UNSPECIFIED CAUSE, UNSPECIFIED CHRONICITY, UNSPECIFIED SITE: ICD-10-CM

## 2022-07-29 DIAGNOSIS — E11.69 TYPE 2 DIABETES MELLITUS WITH OTHER SPECIFIED COMPLICATION, UNSPECIFIED WHETHER LONG TERM INSULIN USE: ICD-10-CM

## 2022-07-29 DIAGNOSIS — Z00.00 ENCOUNTER FOR ANNUAL PHYSICAL EXAM: Primary | ICD-10-CM

## 2022-07-29 DIAGNOSIS — I10 HYPERTENSION, UNCONTROLLED: ICD-10-CM

## 2022-07-29 LAB
25(OH)D3+25(OH)D2 SERPL-MCNC: 53 NG/ML (ref 30–96)
ALBUMIN SERPL BCP-MCNC: 4.5 G/DL (ref 3.5–5.2)
ALBUMIN/CREAT UR: NORMAL UG/MG (ref 0–30)
ALP SERPL-CCNC: 69 U/L (ref 55–135)
ALT SERPL W/O P-5'-P-CCNC: 34 U/L (ref 10–44)
ANION GAP SERPL CALC-SCNC: 14 MMOL/L (ref 8–16)
AST SERPL-CCNC: 24 U/L (ref 10–40)
BASOPHILS # BLD AUTO: 0.02 K/UL (ref 0–0.2)
BASOPHILS NFR BLD: 0.4 % (ref 0–1.9)
BILIRUB SERPL-MCNC: 0.7 MG/DL (ref 0.1–1)
BUN SERPL-MCNC: 14 MG/DL (ref 6–20)
CALCIUM SERPL-MCNC: 9.7 MG/DL (ref 8.7–10.5)
CHLORIDE SERPL-SCNC: 101 MMOL/L (ref 95–110)
CHOLEST SERPL-MCNC: 198 MG/DL (ref 120–199)
CHOLEST/HDLC SERPL: 5.7 {RATIO} (ref 2–5)
CO2 SERPL-SCNC: 23 MMOL/L (ref 23–29)
COMPLEXED PSA SERPL-MCNC: 0.74 NG/ML (ref 0–4)
CREAT SERPL-MCNC: 1.3 MG/DL (ref 0.5–1.4)
CREAT UR-MCNC: 92 MG/DL (ref 23–375)
DIFFERENTIAL METHOD: ABNORMAL
EOSINOPHIL # BLD AUTO: 0.2 K/UL (ref 0–0.5)
EOSINOPHIL NFR BLD: 3.4 % (ref 0–8)
ERYTHROCYTE [DISTWIDTH] IN BLOOD BY AUTOMATED COUNT: 12.6 % (ref 11.5–14.5)
EST. GFR  (AFRICAN AMERICAN): >60 ML/MIN/1.73 M^2
EST. GFR  (NON AFRICAN AMERICAN): >60 ML/MIN/1.73 M^2
ESTIMATED AVG GLUCOSE: 355 MG/DL (ref 68–131)
GLUCOSE SERPL-MCNC: 367 MG/DL (ref 70–110)
HBA1C MFR BLD: 14 % (ref 4–5.6)
HCT VFR BLD AUTO: 42.7 % (ref 40–54)
HDLC SERPL-MCNC: 35 MG/DL (ref 40–75)
HDLC SERPL: 17.7 % (ref 20–50)
HGB BLD-MCNC: 13.6 G/DL (ref 14–18)
IMM GRANULOCYTES # BLD AUTO: 0.01 K/UL (ref 0–0.04)
IMM GRANULOCYTES NFR BLD AUTO: 0.2 % (ref 0–0.5)
LDLC SERPL CALC-MCNC: 101.4 MG/DL (ref 63–159)
LYMPHOCYTES # BLD AUTO: 1.4 K/UL (ref 1–4.8)
LYMPHOCYTES NFR BLD: 26.9 % (ref 18–48)
MCH RBC QN AUTO: 26.7 PG (ref 27–31)
MCHC RBC AUTO-ENTMCNC: 31.9 G/DL (ref 32–36)
MCV RBC AUTO: 84 FL (ref 82–98)
MICROALBUMIN UR DL<=1MG/L-MCNC: <5 UG/ML
MONOCYTES # BLD AUTO: 0.4 K/UL (ref 0.3–1)
MONOCYTES NFR BLD: 6.6 % (ref 4–15)
NEUTROPHILS # BLD AUTO: 3.3 K/UL (ref 1.8–7.7)
NEUTROPHILS NFR BLD: 62.5 % (ref 38–73)
NONHDLC SERPL-MCNC: 163 MG/DL
NRBC BLD-RTO: 0 /100 WBC
PLATELET # BLD AUTO: 138 K/UL (ref 150–450)
PMV BLD AUTO: 12.7 FL (ref 9.2–12.9)
POTASSIUM SERPL-SCNC: 4.4 MMOL/L (ref 3.5–5.1)
PROT SERPL-MCNC: 7.7 G/DL (ref 6–8.4)
RBC # BLD AUTO: 5.1 M/UL (ref 4.6–6.2)
SODIUM SERPL-SCNC: 138 MMOL/L (ref 136–145)
TRIGL SERPL-MCNC: 308 MG/DL (ref 30–150)
TSH SERPL DL<=0.005 MIU/L-ACNC: 1.86 UIU/ML (ref 0.4–4)
URATE SERPL-MCNC: 7.8 MG/DL (ref 3.4–7)
WBC # BLD AUTO: 5.28 K/UL (ref 3.9–12.7)

## 2022-07-29 PROCEDURE — 99999 PR PBB SHADOW E&M-EST. PATIENT-LVL III: CPT | Mod: PBBFAC,,, | Performed by: INTERNAL MEDICINE

## 2022-07-29 PROCEDURE — 99396 PR PREVENTIVE VISIT,EST,40-64: ICD-10-PCS | Mod: S$GLB,,, | Performed by: INTERNAL MEDICINE

## 2022-07-29 PROCEDURE — 84153 ASSAY OF PSA TOTAL: CPT | Performed by: INTERNAL MEDICINE

## 2022-07-29 PROCEDURE — 85025 COMPLETE CBC W/AUTO DIFF WBC: CPT | Performed by: INTERNAL MEDICINE

## 2022-07-29 PROCEDURE — 84443 ASSAY THYROID STIM HORMONE: CPT | Performed by: INTERNAL MEDICINE

## 2022-07-29 PROCEDURE — 82570 ASSAY OF URINE CREATININE: CPT | Performed by: INTERNAL MEDICINE

## 2022-07-29 PROCEDURE — 99999 PR PBB SHADOW E&M-EST. PATIENT-LVL III: ICD-10-PCS | Mod: PBBFAC,,, | Performed by: INTERNAL MEDICINE

## 2022-07-29 PROCEDURE — 82306 VITAMIN D 25 HYDROXY: CPT | Performed by: INTERNAL MEDICINE

## 2022-07-29 PROCEDURE — 83036 HEMOGLOBIN GLYCOSYLATED A1C: CPT | Performed by: INTERNAL MEDICINE

## 2022-07-29 PROCEDURE — 99396 PREV VISIT EST AGE 40-64: CPT | Mod: S$GLB,,, | Performed by: INTERNAL MEDICINE

## 2022-07-29 PROCEDURE — 82043 UR ALBUMIN QUANTITATIVE: CPT | Performed by: INTERNAL MEDICINE

## 2022-07-29 PROCEDURE — 36415 COLL VENOUS BLD VENIPUNCTURE: CPT | Mod: PO | Performed by: INTERNAL MEDICINE

## 2022-07-29 PROCEDURE — 84550 ASSAY OF BLOOD/URIC ACID: CPT | Performed by: INTERNAL MEDICINE

## 2022-07-29 PROCEDURE — 80061 LIPID PANEL: CPT | Performed by: INTERNAL MEDICINE

## 2022-07-29 PROCEDURE — 80053 COMPREHEN METABOLIC PANEL: CPT | Performed by: INTERNAL MEDICINE

## 2022-07-29 NOTE — PROGRESS NOTES
Subjective:       Patient ID: Parminder Gregorio is a pleasant 51 y.o. Black or  male patient    Chief Complaint: Annual Exam (Been going to the restroom a lot )      Patient is a pt I have not seen from 08/2020. See my last notes and the list of problems below.     HPI     Was to see Dr. Booth in November 2021 but cancelled.  He comes today to do his annual exam.  Cannot say why he did not f-up.  Stopped all of his medications. Cannot tell me why.  Still having urinary frequency, up to twice a night, and each 3 hours during daytime.  He drinks plenty of fluid.    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  Documented    Review of Systems   Constitutional: Negative.    Respiratory: Negative.    Cardiovascular: Negative.    Genitourinary: Positive for frequency and urgency. Negative for dysuria and hematuria.       Objective:      Physical Exam  Vitals and nursing note reviewed.   Constitutional:       Appearance: Normal appearance.   HENT:      Right Ear: Tympanic membrane normal.      Left Ear: Tympanic membrane normal.   Eyes:      Conjunctiva/sclera: Conjunctivae normal.   Cardiovascular:      Rate and Rhythm: Normal rate and regular rhythm.      Pulses: Normal pulses.      Heart sounds: Normal heart sounds.   Pulmonary:      Effort: Pulmonary effort is normal.      Breath sounds: Normal breath sounds.   Abdominal:      General:  "Abdomen is flat.      Palpations: Abdomen is soft.      Tenderness: There is no abdominal tenderness.   Musculoskeletal:         General: Normal range of motion.   Skin:     General: Skin is warm and dry.   Neurological:      Mental Status: He is alert. Mental status is at baseline.   Psychiatric:         Mood and Affect: Mood normal.         Behavior: Behavior normal.         Thought Content: Thought content normal.         Judgment: Judgment normal.         Vitals:    07/29/22 1137   BP: 124/78   BP Location: Left arm   Patient Position: Sitting   BP Method: Large (Manual)   Pulse: 80   Resp: 16   Temp: 98.8 °F (37.1 °C)   TempSrc: Oral   SpO2: 96%   Weight: 106.1 kg (233 lb 14.5 oz)   Height: 6' 4" (1.93 m)     Body mass index is 28.47 kg/m².    RESULTS: Reviewed labs from last 12 months    Last Lab Results:     Lab Results   Component Value Date    WBC 4.73 07/08/2020    HGB 12.3 (L) 07/08/2020    HCT 42.0 07/08/2020     (L) 07/08/2020     08/31/2020    K 4.4 08/31/2020     08/31/2020    CO2 23 08/31/2020    BUN 26 (H) 08/31/2020    CREATININE 1.3 08/31/2020    CALCIUM 9.6 08/31/2020    ALBUMIN 4.2 07/08/2020    AST 29 07/08/2020    ALT 44 07/08/2020    CHOL 190 05/19/2020    TRIG 245 (H) 05/19/2020    HDL 47 05/19/2020    LDLCALC 94.0 05/19/2020    HGBA1C 7.0 (H) 08/31/2020    TSH 2.623 05/19/2020    PSA 0.42 05/19/2020         Assessment:       1. Encounter for annual physical exam    2. Hypertension, uncontrolled    3. Type 2 diabetes mellitus with other specified complication, unspecified whether long term insulin use    4. Gout, unspecified cause, unspecified chronicity, unspecified site    5. Urinary frequency    6. Thrombopenia    7. Vitamin D insufficiency        Plan:   Parminder was seen today for annual exam.    Diagnoses and all orders for this visit:    Encounter for annual physical exam  -     CBC Auto Differential; Future  -     Comprehensive Metabolic Panel; Future  -     " Hemoglobin A1C; Future  -     TSH; Future  -     Lipid Panel; Future  -     Hepatitis C Antibody; Future  -     HIV 1/2 Ag/Ab (4th Gen); Future    Will do the usual blood work, discussed preventative measures, will see pt back in 2 weeks to organize everything and review blood work results with him.    Hypertension, uncontrolled    Today at goal. No medications.    Type 2 diabetes mellitus with other specified complication, unspecified whether long term insulin use    No more medications? Will do blood work.    Gout, unspecified cause, unspecified chronicity, unspecified site  -     Uric Acid; Future    No recent flare. Stopped allopurinol.    Urinary frequency  -     Microalbumin/Creatinine Ratio, Urine  -     PSA, Screening; Future  -     Urinalysis, Reflex to Urine Culture Urine, Clean Catch; Future    Will check PSA and UA, may refer to Urologist.    Thrombopenia    Will monitor.     Vitamin D insufficiency  -     Vitamin D; Future    Will check level.    Follow up in about 2 weeks (around 8/12/2022) for f-up .    This note was created by combination of typed  and M-Modal dictation.  Transcription errors may be present.  If there are any questions, please contact me.

## 2022-07-29 NOTE — PROGRESS NOTES
Health Maintenance Due   Topic     Hepatitis C Screening      Pneumococcal Vaccines (Age 0-64) (1 - PCV)     HIV Screening      TETANUS VACCINE      Colorectal Cancer Screening      Shingles Vaccine (1 of 2)     COVID-19 Vaccine (4 - Booster for Pfizer series)

## 2022-08-02 ENCOUNTER — TELEPHONE (OUTPATIENT)
Dept: FAMILY MEDICINE | Facility: CLINIC | Age: 52
End: 2022-08-02
Payer: COMMERCIAL

## 2022-08-02 ENCOUNTER — PATIENT MESSAGE (OUTPATIENT)
Dept: ADMINISTRATIVE | Facility: HOSPITAL | Age: 52
End: 2022-08-02
Payer: COMMERCIAL

## 2022-08-02 NOTE — TELEPHONE ENCOUNTER
Due for eye exam. Patient will bring information of last eye doctor he seen to get records at time of visit.

## 2022-08-04 ENCOUNTER — OFFICE VISIT (OUTPATIENT)
Dept: ENDOCRINOLOGY | Facility: CLINIC | Age: 52
End: 2022-08-04
Payer: COMMERCIAL

## 2022-08-04 VITALS
SYSTOLIC BLOOD PRESSURE: 156 MMHG | WEIGHT: 231.19 LBS | HEART RATE: 78 BPM | TEMPERATURE: 99 F | DIASTOLIC BLOOD PRESSURE: 81 MMHG | BODY MASS INDEX: 28.14 KG/M2

## 2022-08-04 DIAGNOSIS — E11.65 UNCONTROLLED TYPE 2 DIABETES MELLITUS WITH HYPERGLYCEMIA: ICD-10-CM

## 2022-08-04 DIAGNOSIS — E11.65 UNCONTROLLED TYPE 2 DIABETES MELLITUS WITH HYPERGLYCEMIA, WITHOUT LONG-TERM CURRENT USE OF INSULIN: Primary | ICD-10-CM

## 2022-08-04 DIAGNOSIS — I10 HYPERTENSION, UNCONTROLLED: ICD-10-CM

## 2022-08-04 LAB — GLUCOSE SERPL-MCNC: 328 MG/DL (ref 70–110)

## 2022-08-04 PROCEDURE — 99204 PR OFFICE/OUTPT VISIT, NEW, LEVL IV, 45-59 MIN: ICD-10-PCS | Mod: S$GLB,,, | Performed by: HOSPITALIST

## 2022-08-04 PROCEDURE — 82962 GLUCOSE BLOOD TEST: CPT | Mod: S$GLB,,, | Performed by: HOSPITALIST

## 2022-08-04 PROCEDURE — 82962 POCT GLUCOSE, HAND-HELD DEVICE: ICD-10-PCS | Mod: S$GLB,,, | Performed by: HOSPITALIST

## 2022-08-04 PROCEDURE — 99204 OFFICE O/P NEW MOD 45 MIN: CPT | Mod: S$GLB,,, | Performed by: HOSPITALIST

## 2022-08-04 PROCEDURE — 99999 PR PBB SHADOW E&M-EST. PATIENT-LVL IV: ICD-10-PCS | Mod: PBBFAC,,, | Performed by: HOSPITALIST

## 2022-08-04 PROCEDURE — 99999 PR PBB SHADOW E&M-EST. PATIENT-LVL IV: CPT | Mod: PBBFAC,,, | Performed by: HOSPITALIST

## 2022-08-04 RX ORDER — FLASH GLUCOSE SENSOR
KIT MISCELLANEOUS
Qty: 2 KIT | Refills: 11 | Status: SHIPPED | OUTPATIENT
Start: 2022-08-04 | End: 2022-08-22 | Stop reason: SDUPTHER

## 2022-08-04 RX ORDER — GLIPIZIDE AND METFORMIN HCL 5; 500 MG/1; MG/1
1 TABLET, FILM COATED ORAL
Qty: 180 TABLET | Refills: 3 | Status: SHIPPED | OUTPATIENT
Start: 2022-08-04 | End: 2022-09-13

## 2022-08-04 RX ORDER — DULAGLUTIDE 0.75 MG/.5ML
0.75 INJECTION, SOLUTION SUBCUTANEOUS
Qty: 4 PEN | Refills: 11 | Status: SHIPPED | OUTPATIENT
Start: 2022-08-04 | End: 2022-09-03

## 2022-08-04 NOTE — ASSESSMENT & PLAN NOTE
- Diabetes is not at goal, given most current A1C, Goal A1C for patient is 7%  - Limit data/lack of glucose log, making adjustment of diabetes regiment very difficult  - Complicated by hyperglycemia and dietary indiscretion  - Diabetic supplies/medications were reviewed this visit to ensure continue steady supplies  - Advised patient to get routine feet care, routine eye exam, plus routine maintenance screening: lipid, Urine microalbumin  - Diabetes goal including glucose glucose and A1C goals discussed    Plan  - start patient on medical therapy:  Glipizide/metformin 5/500 mg twice a day  - start patient on Trulicity 0.75 mg once a week injection, demo  shown in clinic  - Discussion with patient about dietary modification, portion size control, decreasing carbohydrates intake>>Diabetes education referral: placed, for education  - patient is interested in CGM, Freestyle Latanya 2 device prescription sent to pharmacy, advised patient to contact me if having issues setting up at home.    - Advised patient to scan regularly every 4-6 hours while awake  - Clear written instruction given on AVS.  Close follow up as scheduled

## 2022-08-04 NOTE — PROGRESS NOTES
Subjective:      Patient ID: Parminder Gregorio is a 51 y.o. male presented to [facility] on 8/4/2022.  Chief Complaint:  Diabetes      History of Present Illness: Parminder Gregorio is a 51 y.o. male here for  type 2 diabetes  Other significant past medical history:  Hypertension    With regards to Diabetes Mellitus Type 2  - Known diabetic complications: none  - Diagnosed w/ DM: in 2020  - Diabetes Education: No    Interval history:  Patient with history of diabetes possibly in 2020, lost to follow up with PCP team.  Dietary indiscretion leading to worsening type 2 diabetes, last A1c 14%.  Recent saw new PCP due to polyuria, was not prescribed any diabetes medication.  Does not have testing supply.  Reports stressful job, working long shifts on the [employer] set.  In clinic glucose check: 328       Current reported meds: none   Previous meds tried:              Metformin: in 2020>> reported diarrhea  Home glucose checks: does not check glucose at home  Diet/Exercise:   - Eating 2 meals per day               - Snacking: throughout the day              - Drink: water, diet coke, beer  Weight trend: stable  Diabetes Related Hospitalization:  No  Hx of pancreatitis: No, denies  Family history of diabetes: Yes run in the family  Occupation: working, [employer] industry>> 5 days a week, long day    Eye exam current (within one year): no, DR: no, unknown  Reports cuts or ulcers on feet:   Denies, has podiatrist  Statin: Not taking  ACE/ARB: Not taking    Diabetes Management Status: Reviewed     A1C Trend  Lab Results   Component Value Date    HGBA1C 14.0 (H) 07/29/2022    HGBA1C 7.0 (H) 08/31/2020    HGBA1C 6.9 (H) 05/19/2020    HGBA1C 6.3 (H) 07/24/2017       Lab Results   Component Value Date    MICALBCREAT Unable to calculate 07/29/2022     No results found for: UZTHADTS45  No results found for: TTGIGA    No results found for: CPEPTIDE, GLUTAMICACID, ISLETCELLANT, FRUCTOSAMINE     Screening or Prevention Patient's value  Goal Complete/Controlled?   Lipid profile : 07/29/2022 Annually Yes   Dilated retinal exam Most Recent Eye Exam Date: Not Found Annually Yes   Foot exam   Most Recent Foot Exam Date: Not Found Annually No       Reviewed past surgical, medical, family, social history and updated as appropriate.    Review of Systems: see HPI above    Objective:   BP (!) 156/81   Pulse 78   Temp 98.7 °F (37.1 °C)   Wt 104.9 kg (231 lb 3.2 oz)   BMI 28.14 kg/m²     Body mass index is 28.14 kg/m².  Vital signs reviewed    Physical Exam  Vitals and nursing note reviewed.   Constitutional:       General: He is not in acute distress.     Appearance: Normal appearance. He is well-developed. He is not toxic-appearing.   Neck:      Thyroid: No thyromegaly.   Cardiovascular:      Heart sounds: Normal heart sounds.   Pulmonary:      Effort: Pulmonary effort is normal. No respiratory distress.   Abdominal:      Tenderness: There is no abdominal tenderness.   Musculoskeletal:         General: No deformity. Normal range of motion.      Cervical back: Normal range of motion.   Skin:     Findings: No bruising.   Neurological:      Mental Status: He is alert and oriented to person, place, and time.   Psychiatric:         Mood and Affect: Mood normal.         Lab Reviewed:   Lab Results   Component Value Date    HGBA1C 14.0 (H) 07/29/2022       Lab Results   Component Value Date    CHOL 198 07/29/2022    HDL 35 (L) 07/29/2022    LDLCALC 101.4 07/29/2022    TRIG 308 (H) 07/29/2022    CHOLHDL 17.7 (L) 07/29/2022       Lab Results   Component Value Date     07/29/2022    K 4.4 07/29/2022     07/29/2022    CO2 23 07/29/2022     (H) 07/29/2022    BUN 14 07/29/2022    CREATININE 1.3 07/29/2022    CALCIUM 9.7 07/29/2022    PROT 7.7 07/29/2022    ALBUMIN 4.5 07/29/2022    BILITOT 0.7 07/29/2022    ALKPHOS 69 07/29/2022    AST 24 07/29/2022    ALT 34 07/29/2022    ANIONGAP 14 07/29/2022    ESTGFRAFRICA >60.0 07/29/2022    EGFRNONAA >60.0  07/29/2022    TSH 1.858 07/29/2022        Lab Results   Component Value Date    PTH 52.0 05/19/2020    PTH 81.0 (H) 05/25/2016    [id number] 53 07/29/2022    XWCWWFUX59DC 42 05/19/2020    DSVTERVG35UR 19 (L) 05/25/2016    CALCIUM 9.7 07/29/2022    CALCIUM 9.6 08/31/2020    CALCIUM 8.8 07/08/2020    PHOS 5.1 (H) 05/25/2016    ALKPHOS 69 07/29/2022    ALKPHOS 48 (L) 07/08/2020    ALKPHOS 54 (L) 05/19/2020    TSH 1.858 07/29/2022       Assessment     1. Uncontrolled type 2 diabetes mellitus with hyperglycemia, without long-term current use of insulin  POCT Glucose, Hand-Held Device    FREESTYLE JOANN 2 SENSOR Kit    TRULICITY 0.75 mg/0.5 mL pen injector    glipizide-metformin (METAGLIP) 5-500 mg per tablet    Ambulatory referral/consult to Diabetes Education   2. Uncontrolled type 2 diabetes mellitus with hyperglycemia  Ambulatory referral/consult to Endocrinology   3. Hypertension, uncontrolled          Plan     Uncontrolled type 2 diabetes mellitus with hyperglycemia, without long-term current use of insulin  - Diabetes is not at goal, given most current A1C, Goal A1C for patient is 7%  - Limit data/lack of glucose log, making adjustment of diabetes regiment very difficult  - Complicated by hyperglycemia and dietary indiscretion  - Diabetic supplies/medications were reviewed this visit to ensure continue steady supplies  - Advised patient to get routine feet care, routine eye exam, plus routine maintenance screening: lipid, Urine microalbumin  - Diabetes goal including glucose glucose and A1C goals discussed    Plan  - start patient on medical therapy:  Glipizide/metformin 5/500 mg twice a day  - start patient on Trulicity 0.75 mg once a week injection, demo  shown in clinic  - Discussion with patient about dietary modification, portion size control, decreasing carbohydrates intake>>Diabetes education referral: placed, for education  - patient is interested in CGM, Freestyle Joann 2 device prescription sent to  pharmacy, advised patient to contact me if having issues setting up at home.    - Advised patient to scan regularly every 4-6 hours while awake  - Clear written instruction given on AVS.  Close follow up as scheduled     Hypertension, uncontrolled  - BP reviewed today, elevation  - manage by PCP       Advised patient to follow up with PCP for routine health maintenance care.   RTC in 4-6 weeks      Boston Church M.D.  Endocrinology  [facility]  8/4/2022      Disclaimer: This note has been generated in part with the use of voice-recognition software. There may be typographical errors that have been missed during proof-reading.

## 2022-08-04 NOTE — PATIENT INSTRUCTIONS
"(Thank you for completing this visit with me and PLEASE follow up with me regularly for continue refills of your diabetes medications)  ----------------------------------------------------------    Regiment:     __ Glipizide-Metformin 5/500 twice a day with food  Start: Trulicity 0.75mg once a week injection    Goal blood sugar in the morning, before breakfast:   Glucose goal AFTER MEALS:    2 Hour after: less than 140  Before going to bed: 100-140  Do not go to bed with glucose less than 100  Have a small snack if glucose is lower than 100    Please check glucose 3 times a day (before breakfast, lunch, dinner and at bedtime).   You can keep track of the glucose with Glucose logs or any papers  Please filled out and bring back to next office visit for me to review  Document any (LOW BLOOD GLUCOSE) hypoglycemia  episode with date and time for me to review    Continue glucose monitor  Freestyle Latanya monitor  Remember to scan to check your blood sugar regularly 4 to 5  times a day  When you wake up and before bed and every 4-6 hours while awake (before each meals)  THE MORE YOU SCAN MORE INFORMATION I GET.    HOW TO SET UP SENSOR, please visit this website:       >>>>> [email or web address]  Or we can have a diabetes educator set it up. Call our office  To get a free Freestyle Latanya 2 reader, go to [employer] and select option for "phone incompatible." Or call [phone].       IF COST is an issue, if you are asked to play >75$ a month for 2 sensors, or questions about coverage  PLEASE CALL [employer] at [phone] or [phone]  to enquired about FreeStyle Latanya discount/voucher   For errors/faulty devices/issues: please Tech support at: [phone]  They may give you new replacement parts      Tip for skin rash from glucose monitor sensor use  BEFORE sensor placement:  First tip: Spray Over the counter Flonase on your skin before putting the sensor on. This should " alleviate any skin reactions to the adhesive.  Second tip: Use protective skin wipe, before applying a new sensor: Example: Skin Tac Adhesive Barrier Prep Wipe. This will acts as a barrier between the skin and the sensors adhesive.  If your skin is irritated redness/rash AFTER taken off glucose monitor:   you can try A&D ointment and neosporin for a few days until it clears up. Avoid using this area until it is healed  You can find these at your local pharmacy or online at [employer]      Please make sure to get your dilated eyes examine once a year with your eye doctor.  Monitor your feet for any cuts or skin breakdown regularly, schedule a visit with your foot doctor/podiatrist yearly if you see one.      We will plan an in-clinic visit in 4-6 Weeks, with labs prior to that appointment.    Contact information:  Boston Church M.D  [facility] [address] [facility] [address], [address]  [place] [address]    Office:  [phone]  Fax:  [phone]     ----------------------------------------------------------

## 2022-08-22 ENCOUNTER — PATIENT MESSAGE (OUTPATIENT)
Dept: ENDOCRINOLOGY | Facility: CLINIC | Age: 52
End: 2022-08-22
Payer: COMMERCIAL

## 2022-08-22 DIAGNOSIS — E11.65 UNCONTROLLED TYPE 2 DIABETES MELLITUS WITH HYPERGLYCEMIA, WITHOUT LONG-TERM CURRENT USE OF INSULIN: ICD-10-CM

## 2022-08-22 RX ORDER — FLASH GLUCOSE SENSOR
KIT MISCELLANEOUS
Qty: 2 KIT | Refills: 11 | Status: SHIPPED | OUTPATIENT
Start: 2022-08-22

## 2022-09-13 ENCOUNTER — OFFICE VISIT (OUTPATIENT)
Dept: ENDOCRINOLOGY | Facility: CLINIC | Age: 52
End: 2022-09-13
Payer: COMMERCIAL

## 2022-09-13 VITALS
WEIGHT: 235.31 LBS | SYSTOLIC BLOOD PRESSURE: 154 MMHG | TEMPERATURE: 98 F | DIASTOLIC BLOOD PRESSURE: 91 MMHG | HEART RATE: 92 BPM | BODY MASS INDEX: 28.64 KG/M2

## 2022-09-13 DIAGNOSIS — I10 HYPERTENSION, UNCONTROLLED: ICD-10-CM

## 2022-09-13 DIAGNOSIS — E55.9 HYPOVITAMINOSIS D: ICD-10-CM

## 2022-09-13 DIAGNOSIS — E11.9 CONTROLLED TYPE 2 DIABETES MELLITUS WITHOUT COMPLICATION, WITHOUT LONG-TERM CURRENT USE OF INSULIN: Primary | ICD-10-CM

## 2022-09-13 PROBLEM — E11.65 UNCONTROLLED TYPE 2 DIABETES MELLITUS WITH HYPERGLYCEMIA, WITHOUT LONG-TERM CURRENT USE OF INSULIN: Status: RESOLVED | Noted: 2022-08-04 | Resolved: 2022-09-13

## 2022-09-13 PROCEDURE — 99214 PR OFFICE/OUTPT VISIT, EST, LEVL IV, 30-39 MIN: ICD-10-PCS | Mod: S$GLB,,, | Performed by: HOSPITALIST

## 2022-09-13 PROCEDURE — 3046F PR MOST RECENT HEMOGLOBIN A1C LEVEL > 9.0%: ICD-10-PCS | Mod: CPTII,S$GLB,, | Performed by: HOSPITALIST

## 2022-09-13 PROCEDURE — 95251 CONT GLUC MNTR ANALYSIS I&R: CPT | Mod: S$GLB,,, | Performed by: HOSPITALIST

## 2022-09-13 PROCEDURE — 3008F BODY MASS INDEX DOCD: CPT | Mod: CPTII,S$GLB,, | Performed by: HOSPITALIST

## 2022-09-13 PROCEDURE — 3066F PR DOCUMENTATION OF TREATMENT FOR NEPHROPATHY: ICD-10-PCS | Mod: CPTII,S$GLB,, | Performed by: HOSPITALIST

## 2022-09-13 PROCEDURE — 3008F PR BODY MASS INDEX (BMI) DOCUMENTED: ICD-10-PCS | Mod: CPTII,S$GLB,, | Performed by: HOSPITALIST

## 2022-09-13 PROCEDURE — 1160F PR REVIEW ALL MEDS BY PRESCRIBER/CLIN PHARMACIST DOCUMENTED: ICD-10-PCS | Mod: CPTII,S$GLB,, | Performed by: HOSPITALIST

## 2022-09-13 PROCEDURE — 1160F RVW MEDS BY RX/DR IN RCRD: CPT | Mod: CPTII,S$GLB,, | Performed by: HOSPITALIST

## 2022-09-13 PROCEDURE — 1159F MED LIST DOCD IN RCRD: CPT | Mod: CPTII,S$GLB,, | Performed by: HOSPITALIST

## 2022-09-13 PROCEDURE — 3061F NEG MICROALBUMINURIA REV: CPT | Mod: CPTII,S$GLB,, | Performed by: HOSPITALIST

## 2022-09-13 PROCEDURE — 1159F PR MEDICATION LIST DOCUMENTED IN MEDICAL RECORD: ICD-10-PCS | Mod: CPTII,S$GLB,, | Performed by: HOSPITALIST

## 2022-09-13 PROCEDURE — 3066F NEPHROPATHY DOC TX: CPT | Mod: CPTII,S$GLB,, | Performed by: HOSPITALIST

## 2022-09-13 PROCEDURE — 3046F HEMOGLOBIN A1C LEVEL >9.0%: CPT | Mod: CPTII,S$GLB,, | Performed by: HOSPITALIST

## 2022-09-13 PROCEDURE — 95251 PR GLUCOSE MONITOR, 72 HOUR, PHYS INTERP: ICD-10-PCS | Mod: S$GLB,,, | Performed by: HOSPITALIST

## 2022-09-13 PROCEDURE — 3077F SYST BP >= 140 MM HG: CPT | Mod: CPTII,S$GLB,, | Performed by: HOSPITALIST

## 2022-09-13 PROCEDURE — 3080F PR MOST RECENT DIASTOLIC BLOOD PRESSURE >= 90 MM HG: ICD-10-PCS | Mod: CPTII,S$GLB,, | Performed by: HOSPITALIST

## 2022-09-13 PROCEDURE — 3080F DIAST BP >= 90 MM HG: CPT | Mod: CPTII,S$GLB,, | Performed by: HOSPITALIST

## 2022-09-13 PROCEDURE — 3061F PR NEG MICROALBUMINURIA RESULT DOCUMENTED/REVIEW: ICD-10-PCS | Mod: CPTII,S$GLB,, | Performed by: HOSPITALIST

## 2022-09-13 PROCEDURE — 99999 PR PBB SHADOW E&M-EST. PATIENT-LVL III: ICD-10-PCS | Mod: PBBFAC,,, | Performed by: HOSPITALIST

## 2022-09-13 PROCEDURE — 3077F PR MOST RECENT SYSTOLIC BLOOD PRESSURE >= 140 MM HG: ICD-10-PCS | Mod: CPTII,S$GLB,, | Performed by: HOSPITALIST

## 2022-09-13 PROCEDURE — 99214 OFFICE O/P EST MOD 30 MIN: CPT | Mod: S$GLB,,, | Performed by: HOSPITALIST

## 2022-09-13 PROCEDURE — 99999 PR PBB SHADOW E&M-EST. PATIENT-LVL III: CPT | Mod: PBBFAC,,, | Performed by: HOSPITALIST

## 2022-09-13 RX ORDER — GLYBURIDE-METFORMIN HYDROCHLORIDE 5; 500 MG/1; MG/1
1 TABLET ORAL 2 TIMES DAILY
COMMUNITY
Start: 2022-08-08 | End: 2022-09-13

## 2022-09-13 RX ORDER — METFORMIN HYDROCHLORIDE 500 MG/1
500 TABLET, EXTENDED RELEASE ORAL DAILY
Qty: 90 TABLET | Refills: 3 | Status: SHIPPED | OUTPATIENT
Start: 2022-09-13 | End: 2023-07-25 | Stop reason: SDUPTHER

## 2022-09-13 NOTE — PROGRESS NOTES
Subjective:      Patient ID: Parminder Gregorio is a 51 y.o. male presented to [facility] on 9/13/2022.  Chief Complaint:  Diabetes      History of Present Illness: Parminder Gregorio is a 51 y.o. male here for  type 2 diabetes  Other significant past medical history:  Hypertension    With regards to Diabetes Mellitus Type 2  - Known diabetic complications: none  - Diagnosed w/ DM: in 2020  - Diabetes Education: No  - previously: Seen 08/22: Patient with history of diabetes possibly in 2020, lost to follow up with PCP team.  Dietary indiscretion leading to worsening type 2 diabetes, last A1c 14%.  Recent saw new PCP due to polyuria, was not prescribed any diabetes medication.  Does not have testing supply. Reports stressful job, working long shifts on the [employer] set. In clinic glucose check: 328    Interval history: Patient is here for follow-up type 2 diabetes.  Has been making changes to his diet.  On freestyle Latanya 2.  Scanning regularly.  Much better glycemic control.  Occasional hypoglycemia postprandial   Patient reportedly stop using Trulicity.  Currently on metformin/glipizide daily      Current reported meds:   Trulicity : Not use  Glipizide/Metformin 5/500 mg daily    Previous meds tried:              Metformin: in 2020>> reported diarrhea  Home glucose checks:  Freestyle Latanya 2  CGM download and interpretation: Data was downloaded and personally reviewed by myself  CGM type: Freestyle latanya 2 sensor   Number of Day CGM worn:   14d, percentage of time CGM is active: 89%  Average blood glucose:  110, Glucose variability: 19.2%, Glucose management indicator (GMI):  5.9%  Time in Range:  0% very high, 0% High, 97% Target Range, 3% low, % very low>> reviewed and discussed, goal TIR discussed with patient    Patient with occasional hypoglycemia in the afternoon due to missed Lunch. No postprandial hyperglycemia much better Time-in-range and GMI  Diet discussed and  reviewed      Diet/Exercise:   - Eating  2 meals per day , making changes diet at home              - Snacking: throughout the day              - Drink: water, diet coke, beer  Weight trend: stable  Diabetes Related Hospitalization:  No  Hx of pancreatitis: No, denies  Family history of diabetes: Yes run in the family  Occupation: working, [employer] industry>> 5 days a week, long day    Eye exam current (within one year): no, DR: no, unknown  Reports cuts or ulcers on feet:   Denies, has podiatrist  Statin: Not taking  ACE/ARB: Not taking    Diabetes Management Status: Reviewed     A1C Trend  Lab Results   Component Value Date    HGBA1C 14.0 (H) 07/29/2022    HGBA1C 7.0 (H) 08/31/2020    HGBA1C 6.9 (H) 05/19/2020    HGBA1C 6.3 (H) 07/24/2017       Lab Results   Component Value Date    MICALBCREAT Unable to calculate 07/29/2022     No results found for: [id number]  No results found for: TTGIGA    No results found for: CPEPTIDE, GLUTAMICACID, ISLETCELLANT, FRUCTOSAMINE     Screening or Prevention Patient's value Goal Complete/Controlled?   Lipid profile : 07/29/2022 Annually Yes   Dilated retinal exam Most Recent Eye Exam Date: Not Found Annually Yes   Foot exam   Most Recent Foot Exam Date: Not Found Annually No       Reviewed past surgical, medical, family, social history and updated as appropriate.    Review of Systems: see HPI above    Objective:   BP (!) 154/91 (BP Location: Right arm)   Pulse 92   Temp 98.1 °F (36.7 °C) (Oral)   Wt 106.7 kg (235 lb 4.8 oz)   BMI 28.64 kg/m²     Body mass index is 28.64 kg/m².  Vital signs reviewed    Physical Exam  Vitals and nursing note reviewed.   Constitutional:       General: He is not in acute distress.     Appearance: Normal appearance. He is well-developed. He is not toxic-appearing.   Neck:      Thyroid: No thyromegaly.   Cardiovascular:      Heart sounds: Normal heart sounds.   Pulmonary:      Effort: Pulmonary effort is normal. No respiratory distress.   Abdominal:      Tenderness: There is no abdominal  tenderness.   Musculoskeletal:         General: No deformity. Normal range of motion.      Cervical back: Normal range of motion.   Skin:     Findings: No bruising.   Neurological:      Mental Status: He is alert and oriented to person, place, and time.   Psychiatric:         Mood and Affect: Mood normal.       Lab Reviewed:   Lab Results   Component Value Date    HGBA1C 14.0 (H) 07/29/2022       Lab Results   Component Value Date    CHOL 198 07/29/2022    HDL 35 (L) 07/29/2022    LDLCALC 101.4 07/29/2022    TRIG 308 (H) 07/29/2022    CHOLHDL 17.7 (L) 07/29/2022       Lab Results   Component Value Date     07/29/2022    K 4.4 07/29/2022     07/29/2022    CO2 23 07/29/2022     (H) 07/29/2022    BUN 14 07/29/2022    CREATININE 1.3 07/29/2022    CALCIUM 9.7 07/29/2022    PROT 7.7 07/29/2022    ALBUMIN 4.5 07/29/2022    BILITOT 0.7 07/29/2022    ALKPHOS 69 07/29/2022    AST 24 07/29/2022    ALT 34 07/29/2022    ANIONGAP 14 07/29/2022    ESTGFRAFRICA >60.0 07/29/2022    EGFRNONAA >60.0 07/29/2022    TSH 1.858 07/29/2022        Lab Results   Component Value Date    PTH 52.0 05/19/2020    PTH 81.0 (H) 05/25/2016    ZIMTLXSN07KZ 53 07/29/2022    CRRWFXME45CL 42 05/19/2020    QNCGXNHE04JQ 19 (L) 05/25/2016    CALCIUM 9.7 07/29/2022    CALCIUM 9.6 08/31/2020    CALCIUM 8.8 07/08/2020    PHOS 5.1 (H) 05/25/2016    ALKPHOS 69 07/29/2022    ALKPHOS 48 (L) 07/08/2020    ALKPHOS 54 (L) 05/19/2020    TSH 1.858 07/29/2022       Assessment     1. Controlled type 2 diabetes mellitus without complication, without long-term current use of insulin  metFORMIN (GLUCOPHAGE-XR) 500 MG ER 24hr tablet    Basic Metabolic Panel    Hemoglobin A1C      2. Hypovitaminosis D        3. Hypertension, uncontrolled               Plan     Controlled type 2 diabetes mellitus without complication, without long-term current use of insulin  - Diabetes is at goal given Time-in-range and GMI on CGM  - suspect A1c will be much improved upon  checking level in 3 months, Goal A1C for patient is 7%  - advised patient to continue dietary modification, increase in exercise, weight loss  - Diabetic supplies/medications were reviewed this visit to ensure continue steady supplies  - Advised patient to get routine feet care, routine eye exam, plus routine maintenance screening: lipid, Urine microalbumin  - Diabetes goal including glucose glucose and A1C goals discussed    Plan  - given occasional hypoglycemia, stop spine urea: Continue metformin 500 mg XR daily  - okay in holding Trulicity per patient  - advised patient to continue CGM Freestyle Latanya 2 if affordable otherwise continue to check glucose fingersticks regularly  - Clear written instruction given on AVS.  Close follow up as scheduled   - 6 months to re-evaluate    Hypertension, uncontrolled  - BP reviewed today, elevation   - advised patient to check and monitor blood pressure at home, bring blood pressure log to PCP  - manage by PCP    Hypovitaminosis D  - history of vitamin-D deficiency  -continue routine monitoring, lab work every year       Advised patient to follow up with PCP for routine health maintenance care.   RTC in 5-6 months      Boston Church M.D.  Endocrinology  [facility]  9/13/2022      Disclaimer: This note has been generated in part with the use of voice-recognition software. There may be typographical errors that have been missed during proof-reading.

## 2022-09-13 NOTE — ASSESSMENT & PLAN NOTE
- BP reviewed today, elevation   - advised patient to check and monitor blood pressure at home, bring blood pressure log to PCP  - manage by PCP

## 2022-09-13 NOTE — ASSESSMENT & PLAN NOTE
- Diabetes is at goal given Time-in-range and GMI on CGM  - suspect A1c will be much improved upon checking level in 3 months, Goal A1C for patient is 7%  - advised patient to continue dietary modification, increase in exercise, weight loss  - Diabetic supplies/medications were reviewed this visit to ensure continue steady supplies  - Advised patient to get routine feet care, routine eye exam, plus routine maintenance screening: lipid, Urine microalbumin  - Diabetes goal including glucose glucose and A1C goals discussed    Plan  - given occasional hypoglycemia, stop spine urea: Continue metformin 500 mg XR daily  - okay in holding Trulicity per patient  - advised patient to continue CGM [employer] Latanya 2 if affordable otherwise continue to check glucose fingersticks regularly  - Clear written instruction given on AVS.  Close follow up as scheduled   - 6 months to re-evaluate

## 2022-10-05 DIAGNOSIS — Z12.11 COLON CANCER SCREENING: ICD-10-CM

## 2022-11-09 ENCOUNTER — PATIENT MESSAGE (OUTPATIENT)
Dept: DIABETES | Facility: CLINIC | Age: 52
End: 2022-11-09
Payer: COMMERCIAL

## 2022-12-22 ENCOUNTER — LAB VISIT (OUTPATIENT)
Dept: LAB | Facility: HOSPITAL | Age: 52
End: 2022-12-22
Attending: HOSPITALIST
Payer: COMMERCIAL

## 2022-12-22 DIAGNOSIS — E11.9 CONTROLLED TYPE 2 DIABETES MELLITUS WITHOUT COMPLICATION, WITHOUT LONG-TERM CURRENT USE OF INSULIN: ICD-10-CM

## 2022-12-22 LAB
ANION GAP SERPL CALC-SCNC: 10 MMOL/L (ref 8–16)
BUN SERPL-MCNC: 19 MG/DL (ref 6–20)
CALCIUM SERPL-MCNC: 9.6 MG/DL (ref 8.7–10.5)
CHLORIDE SERPL-SCNC: 104 MMOL/L (ref 95–110)
CO2 SERPL-SCNC: 26 MMOL/L (ref 23–29)
CREAT SERPL-MCNC: 1.4 MG/DL (ref 0.5–1.4)
EST. GFR  (NO RACE VARIABLE): >60 ML/MIN/1.73 M^2
ESTIMATED AVG GLUCOSE: 140 MG/DL (ref 68–131)
GLUCOSE SERPL-MCNC: 142 MG/DL (ref 70–110)
HBA1C MFR BLD: 6.5 % (ref 4–5.6)
POTASSIUM SERPL-SCNC: 4.6 MMOL/L (ref 3.5–5.1)
SODIUM SERPL-SCNC: 140 MMOL/L (ref 136–145)

## 2022-12-22 PROCEDURE — 83036 HEMOGLOBIN GLYCOSYLATED A1C: CPT | Performed by: HOSPITALIST

## 2022-12-22 PROCEDURE — 80048 BASIC METABOLIC PNL TOTAL CA: CPT | Performed by: HOSPITALIST

## 2022-12-22 PROCEDURE — 36415 COLL VENOUS BLD VENIPUNCTURE: CPT | Performed by: HOSPITALIST

## 2022-12-28 ENCOUNTER — OFFICE VISIT (OUTPATIENT)
Dept: ENDOCRINOLOGY | Facility: CLINIC | Age: 52
End: 2022-12-28
Payer: COMMERCIAL

## 2022-12-28 VITALS
DIASTOLIC BLOOD PRESSURE: 93 MMHG | SYSTOLIC BLOOD PRESSURE: 152 MMHG | BODY MASS INDEX: 29.55 KG/M2 | TEMPERATURE: 98 F | HEART RATE: 85 BPM | WEIGHT: 242.81 LBS

## 2022-12-28 DIAGNOSIS — E11.9 CONTROLLED TYPE 2 DIABETES MELLITUS WITHOUT COMPLICATION, WITHOUT LONG-TERM CURRENT USE OF INSULIN: Primary | ICD-10-CM

## 2022-12-28 DIAGNOSIS — I10 HYPERTENSION, UNCONTROLLED: ICD-10-CM

## 2022-12-28 PROCEDURE — 95251 CONT GLUC MNTR ANALYSIS I&R: CPT | Mod: S$GLB,,, | Performed by: HOSPITALIST

## 2022-12-28 PROCEDURE — 3061F NEG MICROALBUMINURIA REV: CPT | Mod: CPTII,S$GLB,, | Performed by: HOSPITALIST

## 2022-12-28 PROCEDURE — 3008F PR BODY MASS INDEX (BMI) DOCUMENTED: ICD-10-PCS | Mod: CPTII,S$GLB,, | Performed by: HOSPITALIST

## 2022-12-28 PROCEDURE — 3066F NEPHROPATHY DOC TX: CPT | Mod: CPTII,S$GLB,, | Performed by: HOSPITALIST

## 2022-12-28 PROCEDURE — 3044F HG A1C LEVEL LT 7.0%: CPT | Mod: CPTII,S$GLB,, | Performed by: HOSPITALIST

## 2022-12-28 PROCEDURE — 1159F PR MEDICATION LIST DOCUMENTED IN MEDICAL RECORD: ICD-10-PCS | Mod: CPTII,S$GLB,, | Performed by: HOSPITALIST

## 2022-12-28 PROCEDURE — 99999 PR PBB SHADOW E&M-EST. PATIENT-LVL III: ICD-10-PCS | Mod: PBBFAC,,, | Performed by: HOSPITALIST

## 2022-12-28 PROCEDURE — 3077F PR MOST RECENT SYSTOLIC BLOOD PRESSURE >= 140 MM HG: ICD-10-PCS | Mod: CPTII,S$GLB,, | Performed by: HOSPITALIST

## 2022-12-28 PROCEDURE — 3061F PR NEG MICROALBUMINURIA RESULT DOCUMENTED/REVIEW: ICD-10-PCS | Mod: CPTII,S$GLB,, | Performed by: HOSPITALIST

## 2022-12-28 PROCEDURE — 3080F PR MOST RECENT DIASTOLIC BLOOD PRESSURE >= 90 MM HG: ICD-10-PCS | Mod: CPTII,S$GLB,, | Performed by: HOSPITALIST

## 2022-12-28 PROCEDURE — 3008F BODY MASS INDEX DOCD: CPT | Mod: CPTII,S$GLB,, | Performed by: HOSPITALIST

## 2022-12-28 PROCEDURE — 99214 OFFICE O/P EST MOD 30 MIN: CPT | Mod: S$GLB,,, | Performed by: HOSPITALIST

## 2022-12-28 PROCEDURE — 95251 PR GLUCOSE MONITOR, 72 HOUR, PHYS INTERP: ICD-10-PCS | Mod: S$GLB,,, | Performed by: HOSPITALIST

## 2022-12-28 PROCEDURE — 1160F PR REVIEW ALL MEDS BY PRESCRIBER/CLIN PHARMACIST DOCUMENTED: ICD-10-PCS | Mod: CPTII,S$GLB,, | Performed by: HOSPITALIST

## 2022-12-28 PROCEDURE — 3080F DIAST BP >= 90 MM HG: CPT | Mod: CPTII,S$GLB,, | Performed by: HOSPITALIST

## 2022-12-28 PROCEDURE — 1159F MED LIST DOCD IN RCRD: CPT | Mod: CPTII,S$GLB,, | Performed by: HOSPITALIST

## 2022-12-28 PROCEDURE — 99999 PR PBB SHADOW E&M-EST. PATIENT-LVL III: CPT | Mod: PBBFAC,,, | Performed by: HOSPITALIST

## 2022-12-28 PROCEDURE — 3066F PR DOCUMENTATION OF TREATMENT FOR NEPHROPATHY: ICD-10-PCS | Mod: CPTII,S$GLB,, | Performed by: HOSPITALIST

## 2022-12-28 PROCEDURE — 1160F RVW MEDS BY RX/DR IN RCRD: CPT | Mod: CPTII,S$GLB,, | Performed by: HOSPITALIST

## 2022-12-28 PROCEDURE — 3044F PR MOST RECENT HEMOGLOBIN A1C LEVEL <7.0%: ICD-10-PCS | Mod: CPTII,S$GLB,, | Performed by: HOSPITALIST

## 2022-12-28 PROCEDURE — 99214 PR OFFICE/OUTPT VISIT, EST, LEVL IV, 30-39 MIN: ICD-10-PCS | Mod: S$GLB,,, | Performed by: HOSPITALIST

## 2022-12-28 PROCEDURE — 3077F SYST BP >= 140 MM HG: CPT | Mod: CPTII,S$GLB,, | Performed by: HOSPITALIST

## 2022-12-28 RX ORDER — PREDNISOLONE ACETATE 10 MG/ML
SUSPENSION/ DROPS OPHTHALMIC
COMMUNITY
Start: 2022-10-12

## 2022-12-28 RX ORDER — DULAGLUTIDE 0.75 MG/.5ML
0.75 INJECTION, SOLUTION SUBCUTANEOUS
Qty: 4 PEN | Refills: 5 | Status: SHIPPED | OUTPATIENT
Start: 2022-12-28 | End: 2023-12-29

## 2022-12-28 RX ORDER — DULAGLUTIDE 0.75 MG/.5ML
0.75 INJECTION, SOLUTION SUBCUTANEOUS
Qty: 4 PEN | Refills: 5 | Status: SHIPPED | OUTPATIENT
Start: 2022-12-28 | End: 2022-12-28

## 2022-12-28 NOTE — ASSESSMENT & PLAN NOTE
- Body mass index is 29.55 kg/m².  - dietary discussion as above  - continue to monitor weight  - start patient on Trulicity, advised increase in exercise

## 2022-12-28 NOTE — ASSESSMENT & PLAN NOTE
- Diabetes is at goal given Time-in-range and GMI on CGM  - suspect A1c will be much improved upon checking level in 3 months, Goal A1C for patient is 7%  - advised patient to continue dietary modification, increase in exercise, weight loss  - Diabetic supplies/medications were reviewed this visit to ensure continue steady supplies  - Advised patient to get routine feet care, routine eye exam, plus routine maintenance screening: lipid, Urine microalbumin  - Diabetes goal including glucose glucose and A1C goals discussed    Plan  - switch patient to back to Trulicity 0.75 mg once a week injection  - Continue metformin 500 mg XR daily  - advised patient to continue CGM Tapstre[employer] 2 if affordable otherwise continue to check glucose fingersticks regularly  - Clear written instruction given on AVS.  Close follow up as scheduled   - 6 months to re-evaluate

## 2022-12-28 NOTE — PROGRESS NOTES
Subjective:      Patient ID: Parminder Gregorio is a 52 y.o. male presented to [facility] on 12/28/2022.  Chief Complaint:  Diabetes      History of Present Illness: Parminder Gregorio is a 52 y.o. male here for  type 2 diabetes  Other significant past medical history:  Hypertension    With regards to Diabetes Mellitus Type 2  - Known diabetic complications: none  - Diagnosed w/ DM: in 2020  - Diabetes Education: No  - previously: Seen 08/22: Patient with history of diabetes possibly in 2020, lost to follow up with PCP team.  Dietary indiscretion leading to worsening type 2 diabetes, last A1c 14%.  Recent saw new PCP due to polyuria, was not prescribed any diabetes medication.  Does not have testing supply. Reports stressful job, working long shifts on the [employer] set. In clinic glucose check: 328  - has made drastic improvement in diabetes.  Leading to stopping multiple medication Trulicity.      Interval history: Patient is here for follow-up type 2 diabetes.  Has been making changes to his diet.  On freestyle Latanya 2.  A1c improved to 6.5%  Currently taking metformin due, off and on compliance  Requesting Medication help with weight loss      Current reported meds:   Metformin  mg    Previous meds tried:              Metformin: in 2020>> reported diarrhea  Glipizide/Metformin 5/500 mg daily   Trulicity : Not use  Home glucose checks:  Freestyle Latanya 2  CGM download and interpretation: Data was downloaded and personally reviewed by myself  CGM type: Freestyle latanya 2 sensor   Number of Day CGM worn:   14d, percentage of time CGM is active: 89%  Average blood glucose:  114, Glucose variability: 19.2%, Glucose management indicator (GMI):  5.9%  Time in Range:  0% very high, 0% High, 97% Target Range, 3% low, % very low>> reviewed and discussed, goal TIR discussed with patient    Patient with occasional hypoglycemia in the afternoon. 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, long day    Eye exam current (within one year): no, DR: no, unknown  Reports cuts or ulcers on feet:   Denies, has podiatrist  Statin: Not taking  ACE/ARB: Not taking    Diabetes Management Status: Reviewed     A1C Trend  Lab Results   Component Value Date    HGBA1C 6.5 (H) 12/22/2022    HGBA1C 14.0 (H) 07/29/2022    HGBA1C 7.0 (H) 08/31/2020    HGBA1C 6.9 (H) 05/19/2020       Lab Results   Component Value Date    MICALBCREAT Unable to calculate 07/29/2022     No results found for: [id number]  No results found for: TTGIGA    No results found for: CPEPTIDE, GLUTAMICACID, ISLETCELLANT, FRUCTOSAMINE     Screening or Prevention Patient's value Goal Complete/Controlled?   Lipid profile : 07/29/2022 Annually Yes   Dilated retinal exam Most Recent Eye Exam Date: Not Found Annually Yes   Foot exam   Most Recent Foot Exam Date: Not Found Annually No     Reviewed past surgical, medical, family, social history and updated as appropriate.  Review of Systems: see HPI above    Objective:   BP (!) 152/93 (BP Location: Left arm)   Pulse 85   Temp 97.8 °F (36.6 °C) (Oral)   Wt 110.1 kg (242 lb 12.8 oz)   BMI 29.55 kg/m²     Body mass index is 29.55 kg/m².  Vital signs reviewed    Physical Exam  Vitals and nursing note reviewed.   Constitutional:       General: He is not in acute distress.     Appearance: Normal appearance. He is well-developed. He is not toxic-appearing.   Neck:      Thyroid: No thyromegaly.   Cardiovascular:      Heart sounds: Normal heart sounds.   Pulmonary:      Effort: Pulmonary effort is normal. No respiratory distress.    Abdominal:      Tenderness: There is no abdominal tenderness.   Musculoskeletal:         General: No deformity. Normal range of motion.      Cervical back: Normal range of motion.   Skin:     Findings: No bruising.   Neurological:      Mental Status: He is alert and oriented to person, place, and time.   Psychiatric:         Mood and Affect: Mood normal.     Lab Reviewed:   Lab Results   Component Value Date    HGBA1C 6.5 (H) 12/22/2022     Lab Results   Component Value Date    CHOL 198 07/29/2022    HDL 35 (L) 07/29/2022    LDLCALC 101.4 07/29/2022    TRIG 308 (H) 07/29/2022    CHOLHDL 17.7 (L) 07/29/2022     Lab Results   Component Value Date     12/22/2022    K 4.6 12/22/2022     12/22/2022    CO2 26 12/22/2022     (H) 12/22/2022    BUN 19 12/22/2022    CREATININE 1.4 12/22/2022    CALCIUM 9.6 12/22/2022    PROT 7.7 07/29/2022    ALBUMIN 4.5 07/29/2022    BILITOT 0.7 07/29/2022    ALKPHOS 69 07/29/2022    AST 24 07/29/2022    ALT 34 07/29/2022    ANIONGAP 10 12/22/2022    ESTGFRAFRICA >60.0 07/29/2022    EGFRNONAA >60.0 07/29/2022    TSH 1.858 07/29/2022     Lab Results   Component Value Date    PTH 52.0 05/19/2020    PTH 81.0 (H) 05/25/2016    AJKSNAAX72QZ 53 07/29/2022    QCPCPAKD32VN 42 05/19/2020    TGBAJGTI87QH 19 (L) 05/25/2016    CALCIUM 9.6 12/22/2022    CALCIUM 9.7 07/29/2022    CALCIUM 9.6 08/31/2020    PHOS 5.1 (H) 05/25/2016    ALKPHOS 69 07/29/2022    ALKPHOS 48 (L) 07/08/2020    ALKPHOS 54 (L) 05/19/2020    TSH 1.858 07/29/2022     Assessment     1. Controlled type 2 diabetes mellitus without complication, without long-term current use of insulin  Basic Metabolic Panel    Hemoglobin A1C    dulaglutide (TRULICITY) 0.75 mg/0.5 mL pen injector    DISCONTINUED: dulaglutide (TRULICITY) 0.75 mg/0.5 mL pen injector      2. Hypertension, uncontrolled        3. BMI 30.0-30.9,adult            Plan     Controlled type 2 diabetes mellitus without complication, without long-term current use  of insulin  - Diabetes is at goal given Time-in-range and GMI on CGM  - suspect A1c will be much improved upon checking level in 3 months, Goal A1C for patient is 7%  - advised patient to continue dietary modification, increase in exercise, weight loss  - Diabetic supplies/medications were reviewed this visit to ensure continue steady supplies  - Advised patient to get routine feet care, routine eye exam, plus routine maintenance screening: lipid, Urine microalbumin  - Diabetes goal including glucose glucose and A1C goals discussed    Plan  - switch patient to back to Trulicity 0.75 mg once a week injection  - Continue metformin 500 mg XR daily  - advised patient to continue CGM Freestyle Latanya 2 if affordable otherwise continue to check glucose fingersticks regularly  - Clear written instruction given on AVS.  Close follow up as scheduled   - 6 months to re-evaluate    Hypertension, uncontrolled  - BP reviewed today, elevation   - advised patient to check and monitor blood pressure at home, bring blood pressure log to PCP  - manage by PCP    BMI 30.0-30.9,adult  - Body mass index is 29.55 kg/m².  - dietary discussion as above  - continue to monitor weight  - start patient on Trulicity, advised increase in exercise      Advised patient to follow up with PCP for routine health maintenance care.   RTC in 5-6 months      Boston Church M.D.  Endocrinology  [facility]  12/28/2022      Disclaimer: This note has been generated in part with the use of voice-recognition software. There may be typographical errors that have been missed during proof-reading.

## 2023-04-12 ENCOUNTER — PATIENT MESSAGE (OUTPATIENT)
Dept: ADMINISTRATIVE | Facility: HOSPITAL | Age: 53
End: 2023-04-12
Payer: COMMERCIAL

## 2023-05-01 ENCOUNTER — TELEPHONE (OUTPATIENT)
Dept: FAMILY MEDICINE | Facility: CLINIC | Age: 53
End: 2023-05-01
Payer: COMMERCIAL

## 2023-06-21 DIAGNOSIS — E11.9 TYPE 2 DIABETES MELLITUS WITHOUT COMPLICATION, UNSPECIFIED WHETHER LONG TERM INSULIN USE: ICD-10-CM

## 2023-06-26 ENCOUNTER — PATIENT MESSAGE (OUTPATIENT)
Dept: ADMINISTRATIVE | Facility: HOSPITAL | Age: 53
End: 2023-06-26
Payer: COMMERCIAL

## 2023-07-25 ENCOUNTER — LAB VISIT (OUTPATIENT)
Dept: LAB | Facility: HOSPITAL | Age: 53
End: 2023-07-25
Attending: INTERNAL MEDICINE
Payer: COMMERCIAL

## 2023-07-25 ENCOUNTER — OFFICE VISIT (OUTPATIENT)
Dept: FAMILY MEDICINE | Facility: CLINIC | Age: 53
End: 2023-07-25
Payer: COMMERCIAL

## 2023-07-25 VITALS
SYSTOLIC BLOOD PRESSURE: 132 MMHG | WEIGHT: 245.56 LBS | HEIGHT: 76 IN | OXYGEN SATURATION: 96 % | TEMPERATURE: 99 F | HEART RATE: 86 BPM | RESPIRATION RATE: 16 BRPM | BODY MASS INDEX: 29.9 KG/M2 | DIASTOLIC BLOOD PRESSURE: 84 MMHG

## 2023-07-25 DIAGNOSIS — Z12.5 SCREENING FOR MALIGNANT NEOPLASM OF PROSTATE: ICD-10-CM

## 2023-07-25 DIAGNOSIS — M10.9 GOUT, UNSPECIFIED CAUSE, UNSPECIFIED CHRONICITY, UNSPECIFIED SITE: ICD-10-CM

## 2023-07-25 DIAGNOSIS — I10 HYPERTENSION, UNSPECIFIED TYPE: ICD-10-CM

## 2023-07-25 DIAGNOSIS — Z12.11 ENCOUNTER FOR SCREENING FOR MALIGNANT NEOPLASM OF COLON: ICD-10-CM

## 2023-07-25 DIAGNOSIS — D69.6 THROMBOPENIA: ICD-10-CM

## 2023-07-25 DIAGNOSIS — Z00.00 ANNUAL PHYSICAL EXAM: Primary | ICD-10-CM

## 2023-07-25 DIAGNOSIS — E11.69 TYPE 2 DIABETES MELLITUS WITH OTHER SPECIFIED COMPLICATION, UNSPECIFIED WHETHER LONG TERM INSULIN USE: ICD-10-CM

## 2023-07-25 DIAGNOSIS — Z00.00 ANNUAL PHYSICAL EXAM: ICD-10-CM

## 2023-07-25 LAB
ALBUMIN SERPL BCP-MCNC: 4.3 G/DL (ref 3.5–5.2)
ALP SERPL-CCNC: 53 U/L (ref 55–135)
ALT SERPL W/O P-5'-P-CCNC: 29 U/L (ref 10–44)
ANION GAP SERPL CALC-SCNC: 13 MMOL/L (ref 8–16)
AST SERPL-CCNC: 22 U/L (ref 10–40)
BASOPHILS # BLD AUTO: 0.02 K/UL (ref 0–0.2)
BASOPHILS NFR BLD: 0.4 % (ref 0–1.9)
BILIRUB SERPL-MCNC: 0.6 MG/DL (ref 0.1–1)
BUN SERPL-MCNC: 16 MG/DL (ref 6–20)
CALCIUM SERPL-MCNC: 9.5 MG/DL (ref 8.7–10.5)
CHLORIDE SERPL-SCNC: 103 MMOL/L (ref 95–110)
CHOLEST SERPL-MCNC: 184 MG/DL (ref 120–199)
CHOLEST/HDLC SERPL: 4.7 {RATIO} (ref 2–5)
CO2 SERPL-SCNC: 21 MMOL/L (ref 23–29)
COMPLEXED PSA SERPL-MCNC: 0.63 NG/ML (ref 0–4)
CREAT SERPL-MCNC: 1.2 MG/DL (ref 0.5–1.4)
DIFFERENTIAL METHOD: ABNORMAL
EOSINOPHIL # BLD AUTO: 0.3 K/UL (ref 0–0.5)
EOSINOPHIL NFR BLD: 5 % (ref 0–8)
ERYTHROCYTE [DISTWIDTH] IN BLOOD BY AUTOMATED COUNT: 13.3 % (ref 11.5–14.5)
EST. GFR  (NO RACE VARIABLE): >60 ML/MIN/1.73 M^2
ESTIMATED AVG GLUCOSE: 154 MG/DL (ref 68–131)
GLUCOSE SERPL-MCNC: 180 MG/DL (ref 70–110)
HBA1C MFR BLD: 7 % (ref 4–5.6)
HCT VFR BLD AUTO: 42.4 % (ref 40–54)
HCV AB SERPL QL IA: NORMAL
HDLC SERPL-MCNC: 39 MG/DL (ref 40–75)
HDLC SERPL: 21.2 % (ref 20–50)
HGB BLD-MCNC: 13.2 G/DL (ref 14–18)
HIV 1+2 AB+HIV1 P24 AG SERPL QL IA: NORMAL
IMM GRANULOCYTES # BLD AUTO: 0.01 K/UL (ref 0–0.04)
IMM GRANULOCYTES NFR BLD AUTO: 0.2 % (ref 0–0.5)
LDLC SERPL CALC-MCNC: 106.2 MG/DL (ref 63–159)
LYMPHOCYTES # BLD AUTO: 1.6 K/UL (ref 1–4.8)
LYMPHOCYTES NFR BLD: 27.9 % (ref 18–48)
MCH RBC QN AUTO: 25.8 PG (ref 27–31)
MCHC RBC AUTO-ENTMCNC: 31.1 G/DL (ref 32–36)
MCV RBC AUTO: 83 FL (ref 82–98)
MONOCYTES # BLD AUTO: 0.4 K/UL (ref 0.3–1)
MONOCYTES NFR BLD: 6.8 % (ref 4–15)
NEUTROPHILS # BLD AUTO: 3.3 K/UL (ref 1.8–7.7)
NEUTROPHILS NFR BLD: 59.7 % (ref 38–73)
NONHDLC SERPL-MCNC: 145 MG/DL
NRBC BLD-RTO: 0 /100 WBC
PLATELET # BLD AUTO: 139 K/UL (ref 150–450)
PMV BLD AUTO: 10.8 FL (ref 9.2–12.9)
POTASSIUM SERPL-SCNC: 4.7 MMOL/L (ref 3.5–5.1)
PROT SERPL-MCNC: 7.6 G/DL (ref 6–8.4)
RBC # BLD AUTO: 5.11 M/UL (ref 4.6–6.2)
SODIUM SERPL-SCNC: 137 MMOL/L (ref 136–145)
TRIGL SERPL-MCNC: 194 MG/DL (ref 30–150)
TSH SERPL DL<=0.005 MIU/L-ACNC: 2.83 UIU/ML (ref 0.4–4)
URATE SERPL-MCNC: 9.9 MG/DL (ref 3.4–7)
WBC # BLD AUTO: 5.55 K/UL (ref 3.9–12.7)

## 2023-07-25 PROCEDURE — 87389 HIV-1 AG W/HIV-1&-2 AB AG IA: CPT | Performed by: INTERNAL MEDICINE

## 2023-07-25 PROCEDURE — 99396 PREV VISIT EST AGE 40-64: CPT | Mod: S$GLB,,, | Performed by: INTERNAL MEDICINE

## 2023-07-25 PROCEDURE — 1160F PR REVIEW ALL MEDS BY PRESCRIBER/CLIN PHARMACIST DOCUMENTED: ICD-10-PCS | Mod: CPTII,S$GLB,, | Performed by: INTERNAL MEDICINE

## 2023-07-25 PROCEDURE — 3075F PR MOST RECENT SYSTOLIC BLOOD PRESS GE 130-139MM HG: ICD-10-PCS | Mod: CPTII,S$GLB,, | Performed by: INTERNAL MEDICINE

## 2023-07-25 PROCEDURE — 84443 ASSAY THYROID STIM HORMONE: CPT | Performed by: INTERNAL MEDICINE

## 2023-07-25 PROCEDURE — 3079F DIAST BP 80-89 MM HG: CPT | Mod: CPTII,S$GLB,, | Performed by: INTERNAL MEDICINE

## 2023-07-25 PROCEDURE — 99999 PR PBB SHADOW E&M-EST. PATIENT-LVL III: ICD-10-PCS | Mod: PBBFAC,,, | Performed by: INTERNAL MEDICINE

## 2023-07-25 PROCEDURE — 1159F PR MEDICATION LIST DOCUMENTED IN MEDICAL RECORD: ICD-10-PCS | Mod: CPTII,S$GLB,, | Performed by: INTERNAL MEDICINE

## 2023-07-25 PROCEDURE — 1160F RVW MEDS BY RX/DR IN RCRD: CPT | Mod: CPTII,S$GLB,, | Performed by: INTERNAL MEDICINE

## 2023-07-25 PROCEDURE — 3075F SYST BP GE 130 - 139MM HG: CPT | Mod: CPTII,S$GLB,, | Performed by: INTERNAL MEDICINE

## 2023-07-25 PROCEDURE — 80061 LIPID PANEL: CPT | Performed by: INTERNAL MEDICINE

## 2023-07-25 PROCEDURE — 85025 COMPLETE CBC W/AUTO DIFF WBC: CPT | Performed by: INTERNAL MEDICINE

## 2023-07-25 PROCEDURE — 84153 ASSAY OF PSA TOTAL: CPT | Performed by: INTERNAL MEDICINE

## 2023-07-25 PROCEDURE — 99396 PR PREVENTIVE VISIT,EST,40-64: ICD-10-PCS | Mod: S$GLB,,, | Performed by: INTERNAL MEDICINE

## 2023-07-25 PROCEDURE — 84550 ASSAY OF BLOOD/URIC ACID: CPT | Performed by: INTERNAL MEDICINE

## 2023-07-25 PROCEDURE — 99999 PR PBB SHADOW E&M-EST. PATIENT-LVL III: CPT | Mod: PBBFAC,,, | Performed by: INTERNAL MEDICINE

## 2023-07-25 PROCEDURE — 3079F PR MOST RECENT DIASTOLIC BLOOD PRESSURE 80-89 MM HG: ICD-10-PCS | Mod: CPTII,S$GLB,, | Performed by: INTERNAL MEDICINE

## 2023-07-25 PROCEDURE — 1159F MED LIST DOCD IN RCRD: CPT | Mod: CPTII,S$GLB,, | Performed by: INTERNAL MEDICINE

## 2023-07-25 PROCEDURE — 86803 HEPATITIS C AB TEST: CPT | Performed by: INTERNAL MEDICINE

## 2023-07-25 PROCEDURE — 83036 HEMOGLOBIN GLYCOSYLATED A1C: CPT | Performed by: INTERNAL MEDICINE

## 2023-07-25 PROCEDURE — 80053 COMPREHEN METABOLIC PANEL: CPT | Performed by: INTERNAL MEDICINE

## 2023-07-25 PROCEDURE — 3008F PR BODY MASS INDEX (BMI) DOCUMENTED: ICD-10-PCS | Mod: CPTII,S$GLB,, | Performed by: INTERNAL MEDICINE

## 2023-07-25 PROCEDURE — 3008F BODY MASS INDEX DOCD: CPT | Mod: CPTII,S$GLB,, | Performed by: INTERNAL MEDICINE

## 2023-07-25 PROCEDURE — 36415 COLL VENOUS BLD VENIPUNCTURE: CPT | Mod: PO | Performed by: INTERNAL MEDICINE

## 2023-07-25 RX ORDER — METFORMIN HYDROCHLORIDE 500 MG/1
500 TABLET, EXTENDED RELEASE ORAL DAILY
Qty: 90 TABLET | Refills: 3 | Status: SHIPPED | OUTPATIENT
Start: 2023-07-25 | End: 2024-07-24

## 2023-07-25 RX ORDER — DULAGLUTIDE 0.75 MG/.5ML
0.75 INJECTION, SOLUTION SUBCUTANEOUS
COMMUNITY
Start: 2023-07-03 | End: 2023-08-21

## 2023-07-25 NOTE — PROGRESS NOTES
Subjective:       Patient ID: Parminder Gregorio is a pleasant 52 y.o. Black or  male patient    Chief Complaint: Annual Exam      Patient is a pt I saw last on 07/29/2022. See my last notes.    HPI     Patient with past medical history as per list of problems below coming today for his annual physical exam.    Reports feeling globally fine, he has been working on his the diet, reports good lifestyle.    He has follow-up regarding diabetes by Dr. Church, last visit in December 2022, patient had an amazing response to changes of lifestyle, he also was on Trulicity and metformin.  A1c went from 14 to 6.5 %.  He reports that he stopped Trulicity, he was supposed to go on metformin as per Dr. Church, but did not as concerned for hypoglycemia. His BS values are good.    His wife and daughter are now in [place] for 1 week, he could not go and stays home with his dog.       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  Documented    Review of Systems   Constitutional: Negative.    HENT: Negative.     Respiratory: Negative.     Cardiovascular: Negative.    Genitourinary:  Negative for dysuria and hematuria. Dictation #1  MRN:[id number]  CSN:[id number]     Objective:      Physical Exam  Vitals and nursing note reviewed.   Constitutional:       Appearance: Normal appearance.   HENT:      Right Ear: Tympanic membrane normal.      Left Ear: Tympanic membrane  "normal.   Eyes:      Conjunctiva/sclera: Conjunctivae normal.   Cardiovascular:      Rate and Rhythm: Normal rate and regular rhythm.      Pulses: Normal pulses.      Heart sounds: Normal heart sounds.   Pulmonary:      Effort: Pulmonary effort is normal.      Breath sounds: Normal breath sounds.   Abdominal:      General: Abdomen is flat.      Palpations: Abdomen is soft.      Tenderness: There is no abdominal tenderness.   Musculoskeletal:         General: Normal range of motion.   Skin:     General: Skin is warm and dry.   Neurological:      Mental Status: He is alert. Mental status is at baseline.   Psychiatric:         Mood and Affect: Mood normal.         Behavior: Behavior normal.         Thought Content: Thought content normal.         Judgment: Judgment normal.       Vitals:    07/25/23 0833   BP: 132/84   BP Location: Right arm   Patient Position: Sitting   BP Method: Large (Manual)   Pulse: 86   Resp: 16   Temp: 98.8 °F (37.1 °C)   TempSrc: Oral   SpO2: 96%   Weight: 111.4 kg (245 lb 9.5 oz)   Height: 6' 4" (1.93 m)     Body mass index is 29.89 kg/m².    Protective Sensation (w/ 10 gram monofilament):  Right: Intact  Left: Intact    Visual Inspection:  Normal -  Bilateral    Pedal Pulses:   Right: Present  Left: Present    Posterior Tibialis Pulses:   Right:Present  Left: Present     RESULTS: Reviewed labs   from last 12 months    Last Lab Results:     Lab Results   Component Value Date    WBC 5.28 07/29/2022    HGB 13.6 (L) 07/29/2022    HCT 42.7 07/29/2022     (L) 07/29/2022     12/22/2022    K 4.6 12/22/2022     12/22/2022    CO2 26 12/22/2022    BUN 19 12/22/2022    CREATININE 1.4 12/22/2022    CALCIUM 9.6 12/22/2022    ALBUMIN 4.5 07/29/2022    AST 24 07/29/2022    ALT 34 07/29/2022    CHOL 198 07/29/2022    TRIG 308 (H) 07/29/2022    HDL 35 (L) 07/29/2022    LDLCALC 101.4 07/29/2022    HGBA1C 6.5 (H) 12/22/2022    TSH 1.858 07/29/2022    PSA 0.74 07/29/2022       Assessment:     "   1. Annual physical exam    2. Hypertension, unspecified type    3. Type 2 diabetes mellitus with other specified complication, unspecified whether long term insulin use    4. Gout, unspecified cause, unspecified chronicity, unspecified site    5. Thrombopenia    6. Screening for malignant neoplasm of prostate    7. Encounter for screening for malignant neoplasm of colon        Plan:   Parminder was seen today for annual exam.    Diagnoses and all orders for this visit:    Annual physical exam  -     Hepatitis C Antibody; Future  -     HIV 1/2 Ag/Ab (4th Gen); Future    Will do usual blood work, discussed preventative measures and lifestyle that is much better.    Hypertension, unspecified type  -     CBC Auto Differential; Future  -     Comprehensive Metabolic Panel; Future  -     TSH; Future    BP at goal, same treatment.    Type 2 diabetes mellitus with other specified complication, unspecified whether long term insulin use  -     Hemoglobin A1C; Future  -     Lipid Panel; Future  -     metFORMIN (GLUCOPHAGE-XR) 500 MG ER 24hr tablet; Take 1 tablet (500 mg total) by mouth once daily.    Excellent response to lifestyle changes, was on Trulicity for some time, stopped it, he also stopped metformin but I advised him to go on with this, will f-up with Dr. Church. Praised the pt for his excellent job!    Gout, unspecified cause, unspecified chronicity, unspecified site  -     URIC ACID; Future    No recent flare, will monitor. Pt not taking allopurinol on a regular basis. Advised not to take it if flare.    Thrombopenia    As per last blood work, mild, will monitor.    Screening for malignant neoplasm of prostate  -     PSA, Screening; Future    As discussed with pt.    Encounter for screening for malignant neoplasm of colon  -     Fecal Immunochemical Test (iFOBT); Future    FitKit was given to patient on 7/25/2023 08.40 AM        No follow-ups on file.    This note was created by combination of typed  and  M-Modal dictation.  Transcription errors may be present.  If there are any questions, please contact me.

## 2023-07-25 NOTE — PROGRESS NOTES
Health Maintenance Due   Topic     Hepatitis C Screening      Pneumococcal Vaccines (Age 0-64) (1 - PCV)     Foot Exam      Eye Exam      HIV Screening      TETANUS VACCINE      Low Dose Statin      Colorectal Cancer Screening      Shingles Vaccine (1 of 2) hx chickenpox ; inform pt can get vaccine at pharmacy.    COVID-19 Vaccine (4 - Pfizer series)     Hemoglobin A1c      Diabetes Urine Screening

## 2023-07-27 ENCOUNTER — PATIENT OUTREACH (OUTPATIENT)
Dept: ADMINISTRATIVE | Facility: HOSPITAL | Age: 53
End: 2023-07-27
Payer: COMMERCIAL

## 2023-07-27 ENCOUNTER — LAB VISIT (OUTPATIENT)
Dept: LAB | Facility: HOSPITAL | Age: 53
End: 2023-07-27
Attending: INTERNAL MEDICINE
Payer: COMMERCIAL

## 2023-07-27 DIAGNOSIS — Z12.11 ENCOUNTER FOR SCREENING FOR MALIGNANT NEOPLASM OF COLON: ICD-10-CM

## 2023-07-27 DIAGNOSIS — E11.9 TYPE 2 DIABETES MELLITUS WITHOUT COMPLICATION, UNSPECIFIED WHETHER LONG TERM INSULIN USE: Primary | ICD-10-CM

## 2023-07-27 PROCEDURE — 82274 ASSAY TEST FOR BLOOD FECAL: CPT | Performed by: INTERNAL MEDICINE

## 2023-07-27 NOTE — PROGRESS NOTES
Additional Care Coordinator Notes:     HM and immunization's reviewed and updated. Patient will mail out fitkit today. Urine micro patient will wait until next labs.   Patient will mail fitkit today    Further Action Needed If Patient Returns Outreach:  Schedule eye appointment and labs with urine  Population Health Chart Review & Patient Outreach Details:     Reason for Outreach Encounter:     [x]  Non-Compliant Report   []  Payor Report (Hum[employer], PHN, BCBS, MSSP, MCIP, UHC, etc.)   []  Pre-Visit Chart Review     Updates Requested / Reviewed:     [x]  Care Everywhere    []     []  External Sources ([facility], [facility], DIS, etc.)   [x]  Care Team Updated    Patient Outreach Method:    [x]  Telephone Outreach Completed   [x] Successful   [] Left Voicemail   [] Unable to Contact (wrong number, no voicemail)  []  [facility] Outreach Sent  []  Letter Outreach Mailed  []  Fax Sent for External Records  []  External Records Upload    Health Maintenance Topics Addressed and Outreach Outcomes / Actions Taken:        []      Breast Cancer Screening []  Mammo Scheduled      []  External Records Requested     []  Added Reminder to Complete to Upcoming Primary Care Appt Notes     []  Patient Declined     []  Patient Will Call Back to Schedule     []  Patient Will Schedule with External Provider / Order Routed if Applicable             []       Cervical Cancer Screening []  Pap Scheduled      []  External Records Requested     []  Added Reminder to Complete to Upcoming Primary Care Appt Notes     []  Patient Declined     []  Patient Will Call Back to Schedule     []  Patient Will Schedule with External Provider               [x]          Colorectal Cancer Screening []  Colonoscopy Case Request or Referral Placed     []  External Records Requested     []  Added Reminder to Complete to Upcoming Primary Care Appt Notes     []  Patient Declined     []  Patient Will Call Back to Schedule     []  Patient Will Schedule  with External Provider     []  Fit Kit Mailed (add the SmartPhrase under additional notes)     [] PAT Scheduled      [] Cologuard orders placed.      []  Reminded Patient to Complete Home Test             [x]      Diabetic Eye Exam []  Eye Camera Scheduled or Optometry Referral Placed     []  External Records Requested     []  Added Reminder to Complete to Upcoming Primary Care Appt Notes     []  Patient Declined     [x]  Patient Will Call Back to Schedule     []  Patient Will Schedule with External Provider             []      Blood Pressure Control []  Primary Care Follow Up Visit Scheduled     []  Remote Blood Pressure Reading Captured     []  Added Reminder to Complete to Upcoming Primary Care Appt Notes     []  Patient Declined     []  Patient Will Call Back / Patient Will Send Portal Message with Reading     []  Patient Will Call Back to Schedule Provider Visit             [x]       HbA1c & Other Labs []  Lab Appt Scheduled for Due Labs     []  Primary Care Follow Up Visit Scheduled      []  Reminded Patient to Complete Home Test     []  Added Reminder to Complete to Upcoming Primary Care Appt Notes     []  Patient Declined     [x]  Patient Will Call Back to Schedule     []  Patient Will Schedule with External Provider / Order Routed if Applicable           []    Schedule Primary Care Appt []  Primary Care Appt Scheduled     []  Patient Declined     []  Patient Will Call Back to Schedule     []  Pt Established with External Provider & Updated Care Team             []      Medication Adherence []  Primary Care Appointment Scheduled     []  Added Reminder to Upcoming Primary Care Appt Notes     []  Patient Reminded to  Prescription     []  Patient Declined, Provider Notified if Needed     []  Sent Provider Message to Review and/or Add Exclusion to Problem List             []      Osteoporosis Screening []  DXA Appointment Scheduled     []  External Records Requested     []  Added Reminder to Complete to  Upcoming Primary Care Appt Notes     []  Patient Declined     []  Patient Will Call Back to Schedule     []  Patient Will Schedule with External Provider / Order Routed if Applicable

## 2023-08-01 LAB — HEMOCCULT STL QL IA: NEGATIVE

## 2023-08-16 ENCOUNTER — PATIENT MESSAGE (OUTPATIENT)
Dept: FAMILY MEDICINE | Facility: CLINIC | Age: 53
End: 2023-08-16
Payer: COMMERCIAL

## 2023-08-16 DIAGNOSIS — M10.9 GOUT, UNSPECIFIED CAUSE, UNSPECIFIED CHRONICITY, UNSPECIFIED SITE: Primary | ICD-10-CM

## 2023-08-17 RX ORDER — ALLOPURINOL 100 MG/1
100 TABLET ORAL DAILY
Qty: 90 TABLET | Refills: 0 | Status: SHIPPED | OUTPATIENT
Start: 2023-08-17

## 2023-08-17 NOTE — TELEPHONE ENCOUNTER
I pended the allopurinol just in case  
No care due was identified.  [facility] Embedded Care Due Messages. Reference number: [id number].   8/17/2023 7:35:59 AM CDT  
without difficulty

## 2023-08-18 DIAGNOSIS — M10.9 GOUT, UNSPECIFIED CAUSE, UNSPECIFIED CHRONICITY, UNSPECIFIED SITE: ICD-10-CM

## 2023-08-18 RX ORDER — ALLOPURINOL 100 MG/1
100 TABLET ORAL DAILY
Qty: 90 TABLET | Refills: 0 | Status: CANCELLED | OUTPATIENT
Start: 2023-08-18

## 2023-08-18 NOTE — TELEPHONE ENCOUNTER
No care due was identified.  [facility] Embedded Care Due Messages. Reference number: [id number].   8/18/2023 7:28:54 AM CDT

## 2023-08-21 ENCOUNTER — OFFICE VISIT (OUTPATIENT)
Dept: OPTOMETRY | Facility: CLINIC | Age: 53
End: 2023-08-21
Payer: COMMERCIAL

## 2023-08-21 DIAGNOSIS — H52.4 HYPEROPIA WITH PRESBYOPIA OF BOTH EYES: ICD-10-CM

## 2023-08-21 DIAGNOSIS — H52.03 HYPEROPIA WITH PRESBYOPIA OF BOTH EYES: ICD-10-CM

## 2023-08-21 DIAGNOSIS — E11.9 TYPE 2 DIABETES MELLITUS WITHOUT RETINOPATHY: Primary | ICD-10-CM

## 2023-08-21 DIAGNOSIS — E11.9 TYPE 2 DIABETES MELLITUS WITHOUT COMPLICATION, UNSPECIFIED WHETHER LONG TERM INSULIN USE: ICD-10-CM

## 2023-08-21 PROCEDURE — 92004 COMPRE OPH EXAM NEW PT 1/>: CPT | Mod: S$GLB,,,

## 2023-08-21 PROCEDURE — 92015 PR REFRACTION: ICD-10-PCS | Mod: S$GLB,,,

## 2023-08-21 PROCEDURE — 1159F PR MEDICATION LIST DOCUMENTED IN MEDICAL RECORD: ICD-10-PCS | Mod: CPTII,S$GLB,,

## 2023-08-21 PROCEDURE — 92015 DETERMINE REFRACTIVE STATE: CPT | Mod: S$GLB,,,

## 2023-08-21 PROCEDURE — 99999 PR PBB SHADOW E&M-EST. PATIENT-LVL III: CPT | Mod: PBBFAC,,,

## 2023-08-21 PROCEDURE — 3051F HG A1C>EQUAL 7.0%<8.0%: CPT | Mod: CPTII,S$GLB,,

## 2023-08-21 PROCEDURE — 1160F RVW MEDS BY RX/DR IN RCRD: CPT | Mod: CPTII,S$GLB,,

## 2023-08-21 PROCEDURE — 3051F PR MOST RECENT HEMOGLOBIN A1C LEVEL 7.0 - < 8.0%: ICD-10-PCS | Mod: CPTII,S$GLB,,

## 2023-08-21 PROCEDURE — 1159F MED LIST DOCD IN RCRD: CPT | Mod: CPTII,S$GLB,,

## 2023-08-21 PROCEDURE — 1160F PR REVIEW ALL MEDS BY PRESCRIBER/CLIN PHARMACIST DOCUMENTED: ICD-10-PCS | Mod: CPTII,S$GLB,,

## 2023-08-21 PROCEDURE — 99999 PR PBB SHADOW E&M-EST. PATIENT-LVL III: ICD-10-PCS | Mod: PBBFAC,,,

## 2023-08-21 PROCEDURE — 92004 PR EYE EXAM, NEW PATIENT,COMPREHESV: ICD-10-PCS | Mod: S$GLB,,,

## 2023-08-21 NOTE — PROGRESS NOTES
HPI    The patient reports for his diabetic eye exam. Sees Dr. Figueroa annually.   Stopped Trulicity, taking metformin once a week. States he has BS under   control now after changing his diet but vision is blurred OU. Needs   readers for any near work, +2.00 OTC readers. Denies ocular irritation or   using gtts. No f/f OU.    Hemoglobin A1C       Date                     Value               Ref Range             Status                07/25/2023               7.0 (H)             4.0 - 5.6 %           Final                  12/22/2022               6.5 (H)             4.0 - 5.6 %           Final                  07/29/2022               14.0 (H)            4.0 - 5.6 %           Final               Last edited by Michelle Valencia, OD on 8/21/2023  3:33 PM.        ROS    Positive for: Endocrine, Eyes  Negative for: Constitutional, Gastrointestinal, Neurological, Skin,   Genitourinary, Musculoskeletal, HENT, Cardiovascular, Respiratory,   Psychiatric, Allergic/Imm, Heme/Lymph  Last edited by Michelle Valencia, OD on 8/21/2023 10:17 AM.        Assessment /Plan     For exam results, see Encounter Report.    Type 2 diabetes mellitus without retinopathy    Type 2 diabetes mellitus without complication, unspecified whether long term insulin use  -     Ambulatory referral/consult to Optometry    Hyperopia with presbyopia of both eyes      1,2. No diabetic retinopathy or csme evident on today's exam. Ed pt on importance of monitoring BS due to potential for visual fluctuations, continue care with PCP. Monitor with yearly dilated exam.  3.    Updated pt's spec rx and released final copy, single vision readers. Ed pt on change in rx and adaptation. RTC for annual exam.    RTC in 1 year for annual eye exam or sooner prn.

## 2023-10-11 DIAGNOSIS — E11.9 TYPE 2 DIABETES MELLITUS WITHOUT COMPLICATION: ICD-10-CM

## 2023-10-16 ENCOUNTER — PATIENT MESSAGE (OUTPATIENT)
Dept: ADMINISTRATIVE | Facility: HOSPITAL | Age: 53
End: 2023-10-16
Payer: COMMERCIAL

## 2023-12-06 ENCOUNTER — PATIENT OUTREACH (OUTPATIENT)
Dept: ADMINISTRATIVE | Facility: HOSPITAL | Age: 53
End: 2023-12-06
Payer: COMMERCIAL

## 2023-12-06 NOTE — PROGRESS NOTES
Population Health Chart Review & Patient Outreach Details      Further Action Needed If Patient Returns Outreach:      Schedule for urine collect.       Updates Requested / Reviewed:     [x]  Care Everywhere    []     []  External Sources ([facility], [facility], [facility], etc.)    [] [facility]   [] [facility]   [] Other:    []  Care Team Updated   []  Removed  or Duplicate Orders   []  Immunization Reconciliation Completed / Queried    [] [place]   [] [place]   [] [place]   [] [place]      Health Maintenance Topics Addressed and Outreach Outcomes / Actions Taken:             Breast Cancer Screening []  Mammogram Order Placed    []  Mammogram Screening Scheduled    []  External Records Requested & Care Team Updated if Applicable    []  External Records Uploaded & Care Team Updated if Applicable    []  Pt Declined Scheduling Mammogram    []  Pt Will Schedule with External Provider / Order Routed & Care Team Updated if Applicable              Cervical Cancer Screening []  Pap Smear Scheduled in Primary Care or OBGYN    []  External Records Requested & Care Team Updated if Applicable       []  External Records Uploaded, Care Team Updated, & History Updated if Applicable    []  Patient Declined Scheduling Pap Smear    []  Patient Will Schedule with External Provider & Care Team Updated if Applicable                  Colorectal Cancer Screening []  Colonoscopy Case Request / Referral / Home Test Order Placed    []  External Records Requested & Care Team Updated if Applicable    []  External Records Uploaded, Care Team Updated, & History Updated if Applicable    []  Patient Declined Completing Colon Cancer Screening    []  Patient Will Schedule with External Provider & Care Team Updated if Applicable    []  Fit Kit Mailed (add the SmartPhrase under additional notes)    []  Reminded Patient to Complete Home Test                Diabetic Eye Exam []  Eye Exam Screening Order Placed    []  Eye Camera Scheduled or  Optometry/Ophthalmology Referral Placed    []  External Records Requested & Care Team Updated if Applicable    []  External Records Uploaded, Care Team Updated, & History Updated if Applicable    []  Patient Declined Scheduling Eye Exam    []  Patient Will Schedule with External Provider & Care Team Updated if Applicable             Blood Pressure Control []  Primary Care Follow Up Visit Scheduled     []  Remote Blood Pressure Reading Captured    []  Patient Declined Remote Reading or Scheduling Appt - Escalated to PCP    []  Patient Will Call Back or Send Portal Message with Reading                 HbA1c & Other Labs []  Overdue Lab(s) Ordered    []  Overdue Lab(s) Scheduled    []  External Records Uploaded & Care Team Updated if Applicable    []  Primary Care Follow Up Visit Scheduled     []  Reminded Patient to Complete A1c Home Test    []  Patient Declined Scheduling Labs or Will Call Back to Schedule    []  Patient Will Schedule with External Provider / Order Routed, & Care Team Updated if Applicable           Primary Care Appointment []  Primary Care Appt Scheduled    []  Patient Declined Scheduling or Will Call Back to Schedule    []  Pt Established with External Provider, Updated Care Team, & Informed Pt to Notify Payor if Applicable           Medication Adherence /    Statin Use []  Primary Care Appointment Scheduled    []  Patient Reminded to  Prescription    []  Patient Declined, Provider Notified if Needed    []  Sent Provider Message to Review to Evaluate Pt for Statin, Add Exclusion Dx Codes, Document   Exclusion in Problem List, Change Statin Intensity Level to Moderate or High Intensity if Applicable                Osteoporosis Screening []  Dexa Order Placed    []  Dexa Appointment Scheduled    []  External Records Requested & Care Team Updated    []  External Records Uploaded, Care Team Updated, & History Updated if Applicable    []  Patient Declined Scheduling Dexa or Will Call Back to  Schedule    []  Patient Will Schedule with External Provider / Order Routed & Care Team Updated if Applicable       Additional Notes:      Payor report. -- urine collect. Patient will call back to schedule

## 2023-12-28 DIAGNOSIS — E11.9 CONTROLLED TYPE 2 DIABETES MELLITUS WITHOUT COMPLICATION, WITHOUT LONG-TERM CURRENT USE OF INSULIN: ICD-10-CM

## 2023-12-29 RX ORDER — DULAGLUTIDE 0.75 MG/.5ML
0.75 INJECTION, SOLUTION SUBCUTANEOUS
Qty: 4 PEN | Refills: 0 | Status: SHIPPED | OUTPATIENT
Start: 2023-12-29

## 2024-02-29 DIAGNOSIS — E11.9 TYPE 2 DIABETES MELLITUS WITHOUT COMPLICATION: ICD-10-CM

## 2024-04-24 ENCOUNTER — PATIENT MESSAGE (OUTPATIENT)
Dept: ADMINISTRATIVE | Facility: HOSPITAL | Age: 54
End: 2024-04-24
Payer: COMMERCIAL

## 2024-06-17 ENCOUNTER — TELEPHONE (OUTPATIENT)
Dept: FAMILY MEDICINE | Facility: CLINIC | Age: 54
End: 2024-06-17
Payer: COMMERCIAL

## 2024-06-17 DIAGNOSIS — M1A.09X1 IDIOPATHIC CHRONIC GOUT OF MULTIPLE SITES WITH TOPHUS: ICD-10-CM

## 2024-06-17 DIAGNOSIS — I10 HYPERTENSION, UNSPECIFIED TYPE: ICD-10-CM

## 2024-06-17 DIAGNOSIS — Z00.00 ANNUAL PHYSICAL EXAM: Primary | ICD-10-CM

## 2024-06-17 DIAGNOSIS — E11.69 TYPE 2 DIABETES MELLITUS WITH OTHER SPECIFIED COMPLICATION, UNSPECIFIED WHETHER LONG TERM INSULIN USE: ICD-10-CM

## 2024-06-17 DIAGNOSIS — Z12.5 SCREENING FOR MALIGNANT NEOPLASM OF PROSTATE: ICD-10-CM

## 2024-06-17 NOTE — TELEPHONE ENCOUNTER
----- Message from Rhonda Tristan sent at 6/17/2024 12:12 PM CDT -----  Regarding: self    Type: Patient Call Back     Who called:self     What is the request in detail:pt has a lab appt on July 25 for A1c and is asking for any other labs he is due for to be attached to this appt. He is also mentioning he usually has his levels for gout tested as well     Can the clinic reply by [facility]? No     Would the patient rather a call back or a response via [facility]? Call back     Best call back number: [phone]       Additional Information:     Thank you.

## 2024-06-17 NOTE — TELEPHONE ENCOUNTER
Already has orders in for A1c; asking for labs to be ordered for gout and anything else that is needed

## 2024-07-25 ENCOUNTER — LAB VISIT (OUTPATIENT)
Dept: LAB | Facility: HOSPITAL | Age: 54
End: 2024-07-25
Payer: COMMERCIAL

## 2024-07-25 DIAGNOSIS — Z00.00 ANNUAL PHYSICAL EXAM: ICD-10-CM

## 2024-07-25 DIAGNOSIS — Z12.5 SCREENING FOR MALIGNANT NEOPLASM OF PROSTATE: ICD-10-CM

## 2024-07-25 DIAGNOSIS — E11.9 TYPE 2 DIABETES MELLITUS WITHOUT COMPLICATION: ICD-10-CM

## 2024-07-25 DIAGNOSIS — M1A.09X1 IDIOPATHIC CHRONIC GOUT OF MULTIPLE SITES WITH TOPHUS: ICD-10-CM

## 2024-07-25 LAB
ALBUMIN SERPL BCP-MCNC: 4.3 G/DL (ref 3.5–5.2)
ALP SERPL-CCNC: 55 U/L (ref 55–135)
ALT SERPL W/O P-5'-P-CCNC: 42 U/L (ref 10–44)
ANION GAP SERPL CALC-SCNC: 10 MMOL/L (ref 8–16)
AST SERPL-CCNC: 23 U/L (ref 10–40)
BASOPHILS # BLD AUTO: 0.03 K/UL (ref 0–0.2)
BASOPHILS NFR BLD: 0.5 % (ref 0–1.9)
BILIRUB SERPL-MCNC: 0.6 MG/DL (ref 0.1–1)
BUN SERPL-MCNC: 20 MG/DL (ref 6–20)
CALCIUM SERPL-MCNC: 9.2 MG/DL (ref 8.7–10.5)
CHLORIDE SERPL-SCNC: 107 MMOL/L (ref 95–110)
CHOLEST SERPL-MCNC: 184 MG/DL (ref 120–199)
CHOLEST/HDLC SERPL: 4.1 {RATIO} (ref 2–5)
CO2 SERPL-SCNC: 23 MMOL/L (ref 23–29)
COMPLEXED PSA SERPL-MCNC: 0.68 NG/ML (ref 0–4)
CREAT SERPL-MCNC: 1.3 MG/DL (ref 0.5–1.4)
DIFFERENTIAL METHOD BLD: ABNORMAL
EOSINOPHIL # BLD AUTO: 0.3 K/UL (ref 0–0.5)
EOSINOPHIL NFR BLD: 5.3 % (ref 0–8)
ERYTHROCYTE [DISTWIDTH] IN BLOOD BY AUTOMATED COUNT: 12.3 % (ref 11.5–14.5)
EST. GFR  (NO RACE VARIABLE): >60 ML/MIN/1.73 M^2
ESTIMATED AVG GLUCOSE: 148 MG/DL (ref 68–131)
ESTIMATED AVG GLUCOSE: 148 MG/DL (ref 68–131)
GLUCOSE SERPL-MCNC: 132 MG/DL (ref 70–110)
HBA1C MFR BLD: 6.8 % (ref 4–5.6)
HBA1C MFR BLD: 6.8 % (ref 4–5.6)
HCT VFR BLD AUTO: 42.9 % (ref 40–54)
HDLC SERPL-MCNC: 45 MG/DL (ref 40–75)
HDLC SERPL: 24.5 % (ref 20–50)
HGB BLD-MCNC: 13.5 G/DL (ref 14–18)
IMM GRANULOCYTES # BLD AUTO: 0.01 K/UL (ref 0–0.04)
IMM GRANULOCYTES NFR BLD AUTO: 0.2 % (ref 0–0.5)
LDLC SERPL CALC-MCNC: 95.4 MG/DL (ref 63–159)
LYMPHOCYTES # BLD AUTO: 2 K/UL (ref 1–4.8)
LYMPHOCYTES NFR BLD: 34 % (ref 18–48)
MCH RBC QN AUTO: 27.1 PG (ref 27–31)
MCHC RBC AUTO-ENTMCNC: 31.5 G/DL (ref 32–36)
MCV RBC AUTO: 86 FL (ref 82–98)
MONOCYTES # BLD AUTO: 0.5 K/UL (ref 0.3–1)
MONOCYTES NFR BLD: 7.8 % (ref 4–15)
NEUTROPHILS # BLD AUTO: 3 K/UL (ref 1.8–7.7)
NEUTROPHILS NFR BLD: 52.2 % (ref 38–73)
NONHDLC SERPL-MCNC: 139 MG/DL
NRBC BLD-RTO: 0 /100 WBC
PLATELET # BLD AUTO: 133 K/UL (ref 150–450)
PMV BLD AUTO: 11.9 FL (ref 9.2–12.9)
POTASSIUM SERPL-SCNC: 4.2 MMOL/L (ref 3.5–5.1)
PROT SERPL-MCNC: 7.3 G/DL (ref 6–8.4)
RBC # BLD AUTO: 4.98 M/UL (ref 4.6–6.2)
SODIUM SERPL-SCNC: 140 MMOL/L (ref 136–145)
TRIGL SERPL-MCNC: 218 MG/DL (ref 30–150)
TSH SERPL DL<=0.005 MIU/L-ACNC: 2.92 UIU/ML (ref 0.4–4)
URATE SERPL-MCNC: 10.8 MG/DL (ref 3.4–7)
WBC # BLD AUTO: 5.8 K/UL (ref 3.9–12.7)

## 2024-07-25 PROCEDURE — 84153 ASSAY OF PSA TOTAL: CPT | Performed by: INTERNAL MEDICINE

## 2024-07-25 PROCEDURE — 80053 COMPREHEN METABOLIC PANEL: CPT | Performed by: INTERNAL MEDICINE

## 2024-07-25 PROCEDURE — 36415 COLL VENOUS BLD VENIPUNCTURE: CPT | Mod: PO | Performed by: INTERNAL MEDICINE

## 2024-07-25 PROCEDURE — 83036 HEMOGLOBIN GLYCOSYLATED A1C: CPT | Performed by: INTERNAL MEDICINE

## 2024-07-25 PROCEDURE — 84443 ASSAY THYROID STIM HORMONE: CPT | Performed by: INTERNAL MEDICINE

## 2024-07-25 PROCEDURE — 80061 LIPID PANEL: CPT | Performed by: INTERNAL MEDICINE

## 2024-07-25 PROCEDURE — 85025 COMPLETE CBC W/AUTO DIFF WBC: CPT | Performed by: INTERNAL MEDICINE

## 2024-07-25 PROCEDURE — 84550 ASSAY OF BLOOD/URIC ACID: CPT | Performed by: INTERNAL MEDICINE

## 2024-07-31 ENCOUNTER — PATIENT MESSAGE (OUTPATIENT)
Dept: FAMILY MEDICINE | Facility: CLINIC | Age: 54
End: 2024-07-31

## 2024-07-31 ENCOUNTER — OFFICE VISIT (OUTPATIENT)
Dept: FAMILY MEDICINE | Facility: CLINIC | Age: 54
End: 2024-07-31
Payer: COMMERCIAL

## 2024-07-31 ENCOUNTER — TELEPHONE (OUTPATIENT)
Dept: FAMILY MEDICINE | Facility: CLINIC | Age: 54
End: 2024-07-31

## 2024-07-31 ENCOUNTER — LAB VISIT (OUTPATIENT)
Dept: LAB | Facility: HOSPITAL | Age: 54
End: 2024-07-31
Attending: INTERNAL MEDICINE
Payer: COMMERCIAL

## 2024-07-31 VITALS
HEART RATE: 89 BPM | RESPIRATION RATE: 16 BRPM | WEIGHT: 237 LBS | SYSTOLIC BLOOD PRESSURE: 132 MMHG | OXYGEN SATURATION: 97 % | TEMPERATURE: 98 F | HEIGHT: 76 IN | BODY MASS INDEX: 28.86 KG/M2 | DIASTOLIC BLOOD PRESSURE: 82 MMHG

## 2024-07-31 DIAGNOSIS — Z12.11 ENCOUNTER FOR SCREENING FOR MALIGNANT NEOPLASM OF COLON: ICD-10-CM

## 2024-07-31 DIAGNOSIS — D69.6 THROMBOPENIA: ICD-10-CM

## 2024-07-31 DIAGNOSIS — I10 HYPERTENSION, UNSPECIFIED TYPE: Primary | ICD-10-CM

## 2024-07-31 DIAGNOSIS — E11.69 TYPE 2 DIABETES MELLITUS WITH OTHER SPECIFIED COMPLICATION, UNSPECIFIED WHETHER LONG TERM INSULIN USE: ICD-10-CM

## 2024-07-31 DIAGNOSIS — M10.9 GOUT, UNSPECIFIED CAUSE, UNSPECIFIED CHRONICITY, UNSPECIFIED SITE: ICD-10-CM

## 2024-07-31 DIAGNOSIS — Z53.1 REFUSAL OF BLOOD TRANSFUSIONS AS PATIENT IS JEHOVAH'S WITNESS: ICD-10-CM

## 2024-07-31 DIAGNOSIS — I10 HYPERTENSION, UNSPECIFIED TYPE: ICD-10-CM

## 2024-07-31 DIAGNOSIS — Z00.00 ANNUAL PHYSICAL EXAM: Primary | ICD-10-CM

## 2024-07-31 LAB
ALBUMIN/CREAT UR: 2.5 UG/MG (ref 0–30)
CREAT UR-MCNC: 202 MG/DL (ref 23–375)
MICROALBUMIN UR DL<=1MG/L-MCNC: 5 UG/ML

## 2024-07-31 PROCEDURE — 1159F MED LIST DOCD IN RCRD: CPT | Mod: CPTII,S$GLB,, | Performed by: INTERNAL MEDICINE

## 2024-07-31 PROCEDURE — 3008F BODY MASS INDEX DOCD: CPT | Mod: CPTII,S$GLB,, | Performed by: INTERNAL MEDICINE

## 2024-07-31 PROCEDURE — 82043 UR ALBUMIN QUANTITATIVE: CPT | Performed by: INTERNAL MEDICINE

## 2024-07-31 PROCEDURE — 3079F DIAST BP 80-89 MM HG: CPT | Mod: CPTII,S$GLB,, | Performed by: INTERNAL MEDICINE

## 2024-07-31 PROCEDURE — 99999 PR PBB SHADOW E&M-EST. PATIENT-LVL IV: CPT | Mod: PBBFAC,,, | Performed by: INTERNAL MEDICINE

## 2024-07-31 PROCEDURE — 82570 ASSAY OF URINE CREATININE: CPT | Performed by: INTERNAL MEDICINE

## 2024-07-31 PROCEDURE — 3044F HG A1C LEVEL LT 7.0%: CPT | Mod: CPTII,S$GLB,, | Performed by: INTERNAL MEDICINE

## 2024-07-31 PROCEDURE — 3075F SYST BP GE 130 - 139MM HG: CPT | Mod: CPTII,S$GLB,, | Performed by: INTERNAL MEDICINE

## 2024-07-31 PROCEDURE — 99396 PREV VISIT EST AGE 40-64: CPT | Mod: S$GLB,,, | Performed by: INTERNAL MEDICINE

## 2024-07-31 PROCEDURE — 1160F RVW MEDS BY RX/DR IN RCRD: CPT | Mod: CPTII,S$GLB,, | Performed by: INTERNAL MEDICINE

## 2024-07-31 RX ORDER — ROSUVASTATIN CALCIUM 10 MG/1
10 TABLET, COATED ORAL DAILY
Qty: 90 TABLET | Refills: 3 | Status: SHIPPED | OUTPATIENT
Start: 2024-07-31 | End: 2025-07-31

## 2024-07-31 RX ORDER — ALLOPURINOL 100 MG/1
100 TABLET ORAL DAILY
Qty: 90 TABLET | Refills: 0 | Status: SHIPPED | OUTPATIENT
Start: 2024-07-31

## 2024-07-31 RX ORDER — LISINOPRIL 10 MG/1
10 TABLET ORAL DAILY
Qty: 90 TABLET | Refills: 3 | Status: SHIPPED | OUTPATIENT
Start: 2024-07-31 | End: 2025-07-31

## 2024-07-31 NOTE — TELEPHONE ENCOUNTER
Was still refill for lisinopril 10 mg prescribed by Dr. Brown 5 years ago.  He has been taking it recently, not treat  but I think so.  Will refill it but will advised patient to monitor his blood pressure and to contact me if any concern.

## 2024-07-31 NOTE — PROGRESS NOTES
Health Maintenance Due   Topic     Pneumococcal Vaccines (Age 0-64) (1 of 2 - PCV) CONSULT WITH PCP    TETANUS VACCINE  CONSULT WITH PCP    Low Dose Statin  CONSULT WITH PCP    Shingles Vaccine (1 of 2) CONSULT WITH PCP    Diabetes Urine Screening  CONSULT WITH PCP    COVID-19 Vaccine (4 - 2023-24 season) Not offered at this facility.    Foot Exam  CONSULT WITH PCP    Colorectal Cancer Screening  CONSULT WITH PCP    Eye Exam  CONSULT WITH PCP

## 2024-07-31 NOTE — PROGRESS NOTES
Subjective:       Patient ID: Parminder Gregorio is a pleasant 53 y.o. Black or  male patient    Chief Complaint: Annual Exam      Patient is a pt I saw last on 07/25/2023, see my last notes.     HPI:      Very nice pt with past medical history as per list of problems below coming today for his annual physical visit.    From our last encounter:  - Optometry   Reports feeling globally fine, states the has been taking a BP medication that was prescribed to him by his former PCP more than 5 year ago, does not remember the name, seems to be ARB.  He did not check expiration date.    Otherwise reports taking allopurinol not each day, no recent gout flare.    He is going to see Dr. Church, Endocrinologist, tomorrow.  Does not check blood sugar values.  Does not take Trulicity.  He has been working on his lifestyle.  He is active with cutting his grass.      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    BMI 30.0-30.9,adult    Olecranon bursitis of right elbow         PAST MEDICAL HISTORY  Past Medical History:   Diagnosis Date    Acute midline low back pain with right-sided sciatica 4/28/2020    Gout     Gout     Hypertension     Type 2 diabetes mellitus with hyperglycemia, without long-term current use of insulin         PAST SURGICAL HISTORY:  Past Surgical History:   Procedure Laterality Date    LEG SURGERY      OLECRANON BURSECTOMY Right 7/22/2020    Procedure: BURSECTOMY, OLECRANON ;  Surgeon: Bruna Bravo MD;  Location: [facility];  Service: Orthopedics;  Laterality: Right;  excision of right olecranon bursa  NO VENDOR PER DR BRAVO @ 4PM ON 7-9-2020  RN PREOP 7/16/2020---HAS PCP CLEARANCE----COVID NEGATIVE ON 7/20        SOCIAL HISTORY:    reports that he has never smoked. He uses smokeless tobacco. He reports current alcohol use of about 3.0 standard drinks of alcohol per week. He reports that he does not use drugs.     FAMILY HISTORY:  Family History   Problem Relation Name Age of Onset    Diabetes Mother      Stroke Father      Hypertension Father      Diabetes Father      Glaucoma Neg Hx      Macular degeneration Neg Hx      Retinal detachment Neg Hx          ALLERGIES:   Review of patient's allergies indicates:  No Known Allergies    MEDICATIONS:    Current Outpatient Medications:     FREESTYLE JOANN 2 SENSOR Kit, One sensor every to 14 days, Disp: 2 kit, Rfl: 11    metFORMIN (GLUCOPHAGE-XR) 500 MG ER 24hr tablet, Take 1 tablet (500 mg total) by mouth once daily., Disp: 90 tablet, Rfl: 3    allopurinoL (ZYLOPRIM) 100 MG tablet, Take 1 tablet (100 mg total) by mouth once daily., Disp: 90 tablet, Rfl: 0    dulaglutide (TRULICITY) 0.75 mg/0.5 mL pen injector, ADMINISTER 0.75 MG UNDER THE SKIN EVERY 7 DAYS (Patient not taking: Reported on 7/31/2024), Disp: 4 Pen, Rfl: 0    prednisoLONE acetate (PRED FORTE) 1 % DrpS, Place into both eyes. (Patient not taking: Reported on 7/31/2024), Disp: , Rfl:     rosuvastatin (CRESTOR) 10 MG tablet, Take 1 tablet (10 mg total) by mouth once daily., Disp: 90 tablet, Rfl: 3    Review of Systems   Constitutional: Negative.    HENT: Negative.     Respiratory: Negative.     Cardiovascular: Negative.    Genitourinary:  Negative for dysuria and hematuria.       Objective:      Physical Exam  Vitals reviewed.   Constitutional:       Appearance: Normal appearance.   HENT:      Right Ear: Tympanic membrane normal. There is no impacted cerumen.      Left Ear: Tympanic membrane normal. There is no impacted cerumen.   Eyes:      Conjunctiva/sclera: Conjunctivae normal.   Cardiovascular:      Rate and Rhythm: Normal rate and regular rhythm.      Pulses: Normal pulses.      Heart sounds: Normal heart sounds.   Pulmonary:       "Effort: Pulmonary effort is normal.      Breath sounds: Normal breath sounds.   Abdominal:      General: Abdomen is flat.      Palpations: Abdomen is soft.      Tenderness: There is no abdominal tenderness.   Musculoskeletal:         General: Normal range of motion.   Skin:     General: Skin is warm and dry.   Neurological:      Mental Status: He is alert. Mental status is at baseline.   Psychiatric:         Mood and Affect: Mood normal.         Behavior: Behavior normal.         Thought Content: Thought content normal.         Judgment: Judgment normal.         Vitals:    07/31/24 0832   BP: 132/82   BP Location: Left arm   Patient Position: Sitting   BP Method: Large (Manual)   Pulse: 89   Resp: 16   Temp: 97.5 °F (36.4 °C)   TempSrc: Oral   SpO2: 97%   Weight: 107.5 kg (236 lb 15.9 oz)   Height: 6' 4" (1.93 m)     Body mass index is 28.85 kg/m².    RESULTS: Reviewed labs from last 12 months    Last Lab Results:     Lab Results   Component Value Date    WBC 5.80 07/25/2024    HGB 13.5 (L) 07/25/2024    HCT 42.9 07/25/2024     (L) 07/25/2024     07/25/2024    K 4.2 07/25/2024     07/25/2024    CO2 23 07/25/2024    BUN 20 07/25/2024    CREATININE 1.3 07/25/2024    CALCIUM 9.2 07/25/2024    ALBUMIN 4.3 07/25/2024    AST 23 07/25/2024    ALT 42 07/25/2024    CHOL 184 07/25/2024    TRIG 218 (H) 07/25/2024    HDL 45 07/25/2024    LDLCALC 95.4 07/25/2024    HGBA1C 6.8 (H) 07/25/2024    HGBA1C 6.8 (H) 07/25/2024    TSH 2.923 07/25/2024    PSA 0.68 07/25/2024     The 10-year ASCVD risk score (Chioma MARTIN, et al., 2019) is: 12%    Values used to calculate the score:      Age: 53 years      Sex: Male      Is Non- : Yes      Diabetic: Yes      Tobacco smoker: No      Systolic Blood Pressure: 132 mmHg      Is BP treated: No      HDL Cholesterol: 45 mg/dL      Total Cholesterol: 184 mg/dL       Assessment & Plan:       1. Annual physical exam    Reviewed discussed all results of the " blood work, discussed and updated preventative measures and reviewed lifestyle.      2. Hypertension, unspecified type    BP at goal, patient states that he take a BP med prescribed more than 5 years ago (ARB?), he will give me the name through the Portal.  Advised against taking medications that are .    3. Type 2 diabetes mellitus with other specified complication, unspecified whether long term insulin use  -     rosuvastatin (CRESTOR) 10 MG tablet; Take 1 tablet (10 mg total) by mouth once daily.  Dispense: 90 tablet; Refill: 3  -     Microalbumin/Creatinine Ratio, Urine; Future; Expected date: 2024    See blood work results, Will f-up in Endocrinology, but DM controlled. Will start statin and check for proteinuria.    4. Adult BMI 28.0-28.9 kg/sq m    Praised patient with his nice efforts regarding lifestyle.    5. Thrombopenia    Etiology?  Mild, present for at least 5 years.    6. Gout, unspecified cause, unspecified chronicity, unspecified site  -     allopurinoL (ZYLOPRIM) 100 MG tablet; Take 1 tablet (100 mg total) by mouth once daily.  Dispense: 90 tablet; Refill: 0    Refill provided, discussed the importance to have a good diet, and to take allopurinol daily.    7. Refusal of blood transfusions as patient is [employer]an    8. Encounter for screening for malignant neoplasm of colon  -     Fecal Immunochemical Test (iFOBT); Future; Expected date: 2024    As per patient's preference.     No follow-ups on file.    This note was created by combination of typed  and M-Modal dictation.  Transcription errors may be present.  If there are any questions, please contact me.

## 2024-08-01 ENCOUNTER — OFFICE VISIT (OUTPATIENT)
Dept: ENDOCRINOLOGY | Facility: CLINIC | Age: 54
End: 2024-08-01
Payer: COMMERCIAL

## 2024-08-01 VITALS
SYSTOLIC BLOOD PRESSURE: 126 MMHG | DIASTOLIC BLOOD PRESSURE: 72 MMHG | BODY MASS INDEX: 28.99 KG/M2 | WEIGHT: 238.19 LBS | HEART RATE: 81 BPM

## 2024-08-01 DIAGNOSIS — I10 ESSENTIAL HYPERTENSION: ICD-10-CM

## 2024-08-01 DIAGNOSIS — E11.69 TYPE 2 DIABETES MELLITUS WITH OTHER SPECIFIED COMPLICATION, UNSPECIFIED WHETHER LONG TERM INSULIN USE: ICD-10-CM

## 2024-08-01 DIAGNOSIS — E11.65 TYPE 2 DIABETES MELLITUS WITH HYPERGLYCEMIA, WITHOUT LONG-TERM CURRENT USE OF INSULIN: Primary | ICD-10-CM

## 2024-08-01 PROCEDURE — 3061F NEG MICROALBUMINURIA REV: CPT | Mod: CPTII,S$GLB,, | Performed by: HOSPITALIST

## 2024-08-01 PROCEDURE — 3044F HG A1C LEVEL LT 7.0%: CPT | Mod: CPTII,S$GLB,, | Performed by: HOSPITALIST

## 2024-08-01 PROCEDURE — 4010F ACE/ARB THERAPY RXD/TAKEN: CPT | Mod: CPTII,S$GLB,, | Performed by: HOSPITALIST

## 2024-08-01 PROCEDURE — 1160F RVW MEDS BY RX/DR IN RCRD: CPT | Mod: CPTII,S$GLB,, | Performed by: HOSPITALIST

## 2024-08-01 PROCEDURE — 3078F DIAST BP <80 MM HG: CPT | Mod: CPTII,S$GLB,, | Performed by: HOSPITALIST

## 2024-08-01 PROCEDURE — 99214 OFFICE O/P EST MOD 30 MIN: CPT | Mod: S$GLB,,, | Performed by: HOSPITALIST

## 2024-08-01 PROCEDURE — 3008F BODY MASS INDEX DOCD: CPT | Mod: CPTII,S$GLB,, | Performed by: HOSPITALIST

## 2024-08-01 PROCEDURE — 1159F MED LIST DOCD IN RCRD: CPT | Mod: CPTII,S$GLB,, | Performed by: HOSPITALIST

## 2024-08-01 PROCEDURE — 3066F NEPHROPATHY DOC TX: CPT | Mod: CPTII,S$GLB,, | Performed by: HOSPITALIST

## 2024-08-01 PROCEDURE — 3074F SYST BP LT 130 MM HG: CPT | Mod: CPTII,S$GLB,, | Performed by: HOSPITALIST

## 2024-08-01 PROCEDURE — 99999 PR PBB SHADOW E&M-EST. PATIENT-LVL III: CPT | Mod: PBBFAC,,, | Performed by: HOSPITALIST

## 2024-08-01 RX ORDER — BLOOD-GLUCOSE SENSOR
EACH MISCELLANEOUS
Qty: 2 EACH | Refills: 11 | Status: SHIPPED | OUTPATIENT
Start: 2024-08-01

## 2024-08-01 RX ORDER — TIRZEPATIDE 2.5 MG/.5ML
2.5 INJECTION, SOLUTION SUBCUTANEOUS
Qty: 4 PEN | Refills: 6 | Status: SHIPPED | OUTPATIENT
Start: 2024-08-01

## 2024-08-01 NOTE — PROGRESS NOTES
Subjective:      Patient ID: Parminder Gregorio is a 53 y.o. male presented to [facility] on 8/1/2024.  Chief complaint:  Diabetes      History of Present illness: Parminder Gregorio is a 53 y.o. male here for  type 2 diabetes  Other significant past medical history:  Obesity, hypertension  Current PCP Sadie Figueroa MD    Interval history:  Patient lost to follow-up, previously seen by me 12/28/2022 for type 2 diabetes.    In 2022 diabetes poorly control A1c was 14%, which improved to 6.5%.  Since that time PCP has been managing his diabetes, most recent A1c 6.8% 07/2024.  Patient currently only taking metformin 500 mg daily.    No longer on Freestyle Latanya, requesting refill  Previously was on Trulicity, ran out.      Patient is here to request GLP1 to help with diabetes and weight loss.    Current in clinic weight 238 lb.  Previous office visit:  242 lb (2022)      Diabetes mellitus Type 2  - Known diabetic complications: none  - Diagnosed w/ DM: in 2020  - Family history of diabetes: Yes run in the family  - Diabetes Education: No  - previously: Seen 08/22: Patient with history of diabetes possibly in 2020, lost to follow up with PCP team.  Dietary indiscretion leading to worsening type 2 diabetes, last A1c 14%. Recent saw new PCP due to polyuria, was not prescribed any diabetes medication.  Does not have testing supply. Reports stressful job, working long shifts on the [employer].  A1c improved to 6.5% 12/22    Current reported meds:   Metformin  mg    Previous meds tried:              Metformin: in 2020>> reported diarrhea  Glipizide/Metformin 5/500 mg daily   Trulicity : Not use  Previous meds tried:  Home glucose checks: checks not regularly, requesting Freestyle Latanya  Diet/Exercise:   - Eating 2 meals per day , making changes diet at home              - Snacking: throughout the day              - Drink: water, diet coke, beer  - Weight trend: stable  - Occupation: working    Health  "maintenance/prevention:  - Hypoglycemia: Aware a treatment plan for hypoglycemia  - Statin: Taking  - ACE/ARB: Taking  - Urine microalbumin yearly, done  - Eye exam current (within one year): yes,  DR: unknown  - Check feet regularly denies cuts or ulcers on feet    Diabetes lab work  Lab Results   Component Value Date    HGBA1C 6.8 (H) 07/25/2024    HGBA1C 6.8 (H) 07/25/2024    HGBA1C 7.0 (H) 07/25/2023    HGBA1C 6.5 (H) 12/22/2022     No results found for: "CPEPTIDE", "GLUTAMICACID", "ISLETCELLANT"   No results found for: "FRUCTOSAMINE", "GLYCOMARKTM"  Lab Results   Component Value Date    MICALBCREAT 2.5 07/31/2024    MICALBCREAT Unable to calculate 07/29/2022     Diabetes Management Status: Reviewed this office visit  Screening or Prevention Patient's value Goal Complete/Controlled?   Lipid profile : 07/25/2024 Annually Yes   Dilated retinal exam : 08/21/2023 Annually Yes   Foot exam   : 07/25/2023 Annually No     The patient's medications, allergies, past medical, surgical, social and family histories were reviewed and updated as appropriate.  Review of Systems: pertinent positives as per the above HPI, and otherwise negative.    Objective:   /72   Pulse 81   Wt 108 kg (238 lb 3.2 oz)   BMI 28.99 kg/m²   Body mass index is 28.99 kg/m².  Vital signs reviewed    Physical Exam  Vitals and nursing note reviewed.   Constitutional:       Appearance: Normal appearance. He is well-developed. He is obese. He is not ill-appearing.   Neck:      Thyroid: No thyromegaly.   Pulmonary:      Effort: Pulmonary effort is normal. No respiratory distress.   Musculoskeletal:         General: Normal range of motion.      Cervical back: Normal range of motion.   Neurological:      General: No focal deficit present.      Mental Status: He is alert. Mental status is at baseline.   Psychiatric:         Mood and Affect: Mood normal.         Behavior: Behavior normal.       Labs reviewed:  See results in subjective  Lab Results "   Component Value Date    HGBA1C 6.8 (H) 07/25/2024    HGBA1C 6.8 (H) 07/25/2024     Lab Results   Component Value Date    CHOL 184 07/25/2024    HDL 45 07/25/2024    LDLCALC 95.4 07/25/2024    TRIG 218 (H) 07/25/2024    CHOLHDL 24.5 07/25/2024     Lab Results   Component Value Date     07/25/2024    K 4.2 07/25/2024     07/25/2024    CO2 23 07/25/2024     (H) 07/25/2024    BUN 20 07/25/2024    CREATININE 1.3 07/25/2024    CALCIUM 9.2 07/25/2024    PHOS 5.1 (H) 05/25/2016    PROT 7.3 07/25/2024    ALBUMIN 4.3 07/25/2024    BILITOT 0.6 07/25/2024    ALKPHOS 55 07/25/2024    AST 23 07/25/2024    ALT 42 07/25/2024    ANIONGAP 10 07/25/2024    EGFRNORACEVR >60.0 07/25/2024    TSH 2.923 07/25/2024    PTH 52.0 05/19/2020    [id number] 53 07/29/2022     Assessment     1. Type 2 diabetes mellitus with hyperglycemia, without long-term current use of insulin  FREESTYLE JOANN 3 SENSOR Shari    MOUNJARO 2.5 mg/0.5 mL PnIj    Basic Metabolic Panel    Hemoglobin A1C      2. Essential hypertension           Plan     Type 2 diabetes mellitus with hyperglycemia, without long-term current use of insulin  - Diabetes is AT goal, given the most recent A1C.  - Reviewed goals of therapy: achieve the best control possible without hypoglycemia, with a target A1C <7%.  - Reviewed diabetic supplies and medications to ensure continued refills and compliance.  - Advised patient to follow up with endocrinology regularly for continue refills.  - Advised the patient to get periodic eye exams and proper foot care monitoring.  - Reviewed routine maintenance: lipid management (statin use) and urine protein/creatinine yearly.    Plan  - Medication changes:  Patient requesting GLP1/GIP>> start patient on Mounjaro 2.5 mg once a week injection, with plan to increase in the future.  - Continue metformin 500 mg daily  - Encourage dietary modifications, portion size control, and decreasing carbohydrate intake to help manage diabetes and  to help with weight loss.  - Patient is a requesting Freestyle Latanya> sent Freestyle Latanya 3 to pharmacy  - Clear written instructions given on AVS.  - Follow up as scheduled with lab work prior. 4 months in clinic follow-up.     Essential hypertension  - Blood pressure review in clinic today  - On  blood pressure medication  - Manage by PCP       Advised patient to follow up with PCP for routine health maintenance care.   RTC in *4 months    Boston Church M.D.  Endocrinology  [facility]  8/1/2024      Disclaimer: This note has been generated in part with the use of voice-recognition software. There may be typographical errors that have been missed during proof-reading.

## 2024-08-01 NOTE — PATIENT INSTRUCTIONS
Mounjaro 2.5 mg once a week >> [facility] [phone]  After 2 months, no stomach issue, let know I can increase to 5 mg  Metformin 500 mg once a day    Freestyle miller 3      -----------------------------------------------------------------  By combining the medication above with healthy lifestyle choices, you can optimize your weight loss results and improve your overall well-being.      Healthy Eating Habits:  Balanced Diet: Focus on a balanced diet that includes lean proteins, healthy fats, and complex carbohydrates.  Portion Control: Be mindful of portion sizes to avoid overeating.  Low Glycemic Index Foods: Choose low glycemic index foods that provide sustained energy and help control blood sugar levels.  Limit Sugary and Processed Foods: Reduce the intake of sugary snacks, desserts, and processed foods that can cause weight gain.  Hydration: Drink plenty of water throughout the day to stay hydrated and support metabolic processes.    Create a Caloric Deficit: > important for weight loss<  Aim for a safe caloric deficit of 500-1000 calories per day to lose about 1-2 pounds per week.  Avoid extreme calorie restriction, which can be unsustainable and unhealthy.    Portion Control:  Use smaller plates and bowls to help control portion sizes.  Be mindful of portion sizes, especially when eating out.  Meal Planning and Preparation:  Plan your meals and snacks ahead of time to avoid impulsive eating.  Prepare meals at home as much as possible to control ingredients and portions.  Regular Eating Schedule:  Eat regular meals and snacks to maintain steady energy levels and prevent overeating.  Avoid skipping meals, which can lead to increased hunger and overeating later.  Eat slowly and pay attention to your body's hunger and fullness cues.  Avoid distractions like TV or smartphones during meals to help prevent overeating.  Hydration:  Drink plenty of water throughout the day.  Sometimes thirst can be  mistaken for hunger, leading to unnecessary snacking.  Limit Empty Calories:  Reduce intake of sugary drinks, alcohol, and high-calorie snacks with little nutritional value.  Choose nutrient-dense foods that provide vitamins, minerals, and other essential nutrients.    Regular Physical Activity:  Exercise Routine: Incorporate a mix of cardiovascular exercise, strength training, and flexibility exercises into your routine.  Consistency: Aim for at least 150 minutes of moderate-intensity aerobic activity or 75 minutes of vigorous-intensity activity each week, along with muscle-strengthening activities on two or more days a week.  Enjoyable Activities: Choose activities you enjoy to make exercise a regular part of your lifestyle.    Monitor Your Progress:  Track Your Food Intake: Use a food diary or lamonte to monitor what you eat and ensure you're meeting your nutritional goals.  Monitor Weight and Measurements: Regularly check your weight and body measurements to track your progress.  Set Realistic Goals: Set achievable weight loss goals and celebrate your progress along the way.

## 2024-08-01 NOTE — ASSESSMENT & PLAN NOTE
- Diabetes is AT goal, given the most recent A1C.  - Reviewed goals of therapy: achieve the best control possible without hypoglycemia, with a target A1C <7%.  - Reviewed diabetic supplies and medications to ensure continued refills and compliance.  - Advised patient to follow up with endocrinology regularly for continue refills.  - Advised the patient to get periodic eye exams and proper foot care monitoring.  - Reviewed routine maintenance: lipid management (statin use) and urine protein/creatinine yearly.    Plan  - Medication changes:  Patient requesting GLP1/GIP>> start patient on Mounjaro 2.5 mg once a week injection, with plan to increase in the future.  - Continue metformin 500 mg daily  - Encourage dietary modifications, portion size control, and decreasing carbohydrate intake to help manage diabetes and to help with weight loss.  - Patient is a requesting Freestyle Latanya> sent Freestyle Latanya 3 to pharmacy  - Clear written instructions given on AVS.  - Follow up as scheduled with lab work prior. 4 months in clinic follow-up.

## 2024-08-01 NOTE — TELEPHONE ENCOUNTER
No care due was identified.  Health [facility] Embedded Care Due Messages. Reference number: [id number].   8/01/2024 6:11:12 PM CDT

## 2024-08-02 RX ORDER — METFORMIN HYDROCHLORIDE 500 MG/1
500 TABLET, EXTENDED RELEASE ORAL
Qty: 90 TABLET | Refills: 1 | Status: SHIPPED | OUTPATIENT
Start: 2024-08-02

## 2024-08-02 NOTE — TELEPHONE ENCOUNTER
Refill Routing Note   Medication(s) are not appropriate for processing by [facility] for the following reason(s):        No active prescription written by provider    [facility] action(s):  Defer               Appointments  past 12m or future 3m with PCP    Date Provider   Last Visit   7/31/2024 Sadie Figueroa MD   Next Visit   Visit date not found Sadie Figueroa MD   ED visits in past 90 days: 0        Note composed:3:37 AM 08/02/2024

## 2024-08-19 ENCOUNTER — LAB VISIT (OUTPATIENT)
Dept: LAB | Facility: HOSPITAL | Age: 54
End: 2024-08-19
Attending: INTERNAL MEDICINE
Payer: COMMERCIAL

## 2024-08-19 DIAGNOSIS — Z12.11 ENCOUNTER FOR SCREENING FOR MALIGNANT NEOPLASM OF COLON: ICD-10-CM

## 2024-08-19 PROCEDURE — 82274 ASSAY TEST FOR BLOOD FECAL: CPT | Performed by: INTERNAL MEDICINE

## 2024-08-22 LAB — HEMOCCULT STL QL IA: NEGATIVE

## 2024-08-23 ENCOUNTER — PATIENT MESSAGE (OUTPATIENT)
Dept: ENDOCRINOLOGY | Facility: CLINIC | Age: 54
End: 2024-08-23
Payer: COMMERCIAL

## 2024-08-23 DIAGNOSIS — E11.65 TYPE 2 DIABETES MELLITUS WITH HYPERGLYCEMIA, WITHOUT LONG-TERM CURRENT USE OF INSULIN: Primary | ICD-10-CM

## 2024-08-23 RX ORDER — TIRZEPATIDE 5 MG/.5ML
5 INJECTION, SOLUTION SUBCUTANEOUS
Qty: 4 PEN | Refills: 6 | Status: SHIPPED | OUTPATIENT
Start: 2024-08-23

## 2024-08-28 DIAGNOSIS — E11.9 TYPE 2 DIABETES MELLITUS WITHOUT COMPLICATION, UNSPECIFIED WHETHER LONG TERM INSULIN USE: ICD-10-CM

## 2024-11-27 ENCOUNTER — LAB VISIT (OUTPATIENT)
Dept: LAB | Facility: HOSPITAL | Age: 54
End: 2024-11-27
Attending: HOSPITALIST
Payer: COMMERCIAL

## 2024-11-27 DIAGNOSIS — E11.65 TYPE 2 DIABETES MELLITUS WITH HYPERGLYCEMIA, WITHOUT LONG-TERM CURRENT USE OF INSULIN: ICD-10-CM

## 2024-11-27 LAB
ANION GAP SERPL CALC-SCNC: 9 MMOL/L (ref 8–16)
BUN SERPL-MCNC: 13 MG/DL (ref 6–20)
CALCIUM SERPL-MCNC: 9.4 MG/DL (ref 8.7–10.5)
CHLORIDE SERPL-SCNC: 105 MMOL/L (ref 95–110)
CO2 SERPL-SCNC: 25 MMOL/L (ref 23–29)
CREAT SERPL-MCNC: 1 MG/DL (ref 0.5–1.4)
EST. GFR  (NO RACE VARIABLE): >60 ML/MIN/1.73 M^2
ESTIMATED AVG GLUCOSE: 111 MG/DL (ref 68–131)
GLUCOSE SERPL-MCNC: 103 MG/DL (ref 70–110)
HBA1C MFR BLD: 5.5 % (ref 4–5.6)
POTASSIUM SERPL-SCNC: 4.6 MMOL/L (ref 3.5–5.1)
SODIUM SERPL-SCNC: 139 MMOL/L (ref 136–145)

## 2024-11-27 PROCEDURE — 80048 BASIC METABOLIC PNL TOTAL CA: CPT | Performed by: HOSPITALIST

## 2024-11-27 PROCEDURE — 83036 HEMOGLOBIN GLYCOSYLATED A1C: CPT | Performed by: HOSPITALIST

## 2024-12-04 ENCOUNTER — OFFICE VISIT (OUTPATIENT)
Dept: ENDOCRINOLOGY | Facility: CLINIC | Age: 54
End: 2024-12-04
Payer: COMMERCIAL

## 2024-12-04 VITALS
BODY MASS INDEX: 27.22 KG/M2 | WEIGHT: 223.63 LBS | DIASTOLIC BLOOD PRESSURE: 92 MMHG | HEART RATE: 85 BPM | SYSTOLIC BLOOD PRESSURE: 136 MMHG

## 2024-12-04 DIAGNOSIS — E55.9 HYPOVITAMINOSIS D: ICD-10-CM

## 2024-12-04 DIAGNOSIS — E11.65 TYPE 2 DIABETES MELLITUS WITH HYPERGLYCEMIA, WITHOUT LONG-TERM CURRENT USE OF INSULIN: Primary | ICD-10-CM

## 2024-12-04 DIAGNOSIS — I10 ESSENTIAL HYPERTENSION: ICD-10-CM

## 2024-12-04 DIAGNOSIS — E11.69 TYPE 2 DIABETES MELLITUS WITH OTHER SPECIFIED COMPLICATION, UNSPECIFIED WHETHER LONG TERM INSULIN USE: ICD-10-CM

## 2024-12-04 PROCEDURE — 99999 PR PBB SHADOW E&M-EST. PATIENT-LVL III: CPT | Mod: PBBFAC,,, | Performed by: HOSPITALIST

## 2024-12-04 RX ORDER — METFORMIN HYDROCHLORIDE 500 MG/1
500 TABLET, EXTENDED RELEASE ORAL DAILY
Qty: 90 TABLET | Refills: 3 | Status: SHIPPED | OUTPATIENT
Start: 2024-12-04

## 2024-12-04 RX ORDER — TIRZEPATIDE 7.5 MG/.5ML
7.5 INJECTION, SOLUTION SUBCUTANEOUS
Qty: 4 PEN | Refills: 11 | Status: SHIPPED | OUTPATIENT
Start: 2024-12-04

## 2024-12-04 NOTE — PROGRESS NOTES
Subjective:      Patient ID: Parminder Gregorio is a 54 y.o. male presented to [facility] on 12/4/2024.  Chief complaint:  Diabetes      History of Present illness: Parminder Gregorio is a 54 y.o. male here for  type 2 diabetes  Other significant past medical history:  Obesity, hypertension  Current PCP No primary care provider on file.    Interval history:  Patient with type 2 diabetes doing well now A1c of 5.5 %.  After starting Mounjaro.  Previous A1c 6.8 7/2024  PCP no longer with [facility].  Current in clinic weight 223 lb.  Previous office visit:  238 (8/2024)<<242 lb (2022)  On Freestyle Latanya 3 CGM  Weight loss: Due to dietary changes and increase in exercise      Diabetes mellitus Type 2  - Known diabetic complications: none  - Diagnosed w/ DM: in 2020  - Family history of diabetes: Yes run in the family  - Diabetes Education: No  - previously: Seen 08/22: Patient with history of diabetes possibly in 2020, lost to follow up with PCP team.  Dietary indiscretion leading to worsening type 2 diabetes, last A1c 14%. Recent saw new PCP due to polyuria, was not prescribed any diabetes medication.  Does not have testing supply. Reports stressful job, working long shifts on the [employer] set.  A1c improved to 6.5% 12/22. Follow up with me in 8/2024  Patient is here to request GLP1 to help with diabetes and weight loss.      Current reported meds:    Mounjaro 5 mg once a week  Metformin  mg    Previous meds tried:              Metformin: in 2020>> reported diarrhea  Glipizide/Metformin 5/500 mg daily   Trulicity : Not use    >CONTINUOUS GLUCOSE MONITOR DOWNLOAD AND INTERPRETATION<:  Data was personally reviewed by myself  CGM type: Freestyle latanya 3 sensor   Number of Day CGM worn: 14 d, percentage of time CGM is active:  99%  Average blood glucose:  93, Glucose variability: , Glucose management indicator (GMI):  5.5%  Time in Range:  0% very high, 1% High, 91% Target Range, 8% low, % very low>>  "reviewed and discussed, goal TIR discussed with patient    Patient with very good glucose control, with average glucose at 93, with target range: 91% in range.  Does have 8% hypoglycemia.  With glucose frequently in the high 60s, asymptomatic per patient report.  He has had history of hypoglycemia in the past with use of glipizide, does not have symptoms similar at all.  Data and Recommendation discussed with patient in clinic today  >>See CGM data scan into our system, media tab<<      Diet/Exercise:   - Eating 2 meals per day , making changes diet at home              - Snacking: throughout the day              - Drink: water, diet coke, beer  - Weight trend: stable  - Occupation: working    Health maintenance/prevention:  - Hypoglycemia: Aware a treatment plan for hypoglycemia  - Statin: Taking  - ACE/ARB: Taking  - Urine microalbumin yearly, done  - Eye exam current (within one year): yes,  DR: unknown  - Check feet regularly denies cuts or ulcers on feet    Diabetes lab work  Lab Results   Component Value Date    HGBA1C 5.5 11/27/2024    HGBA1C 6.8 (H) 07/25/2024    HGBA1C 6.8 (H) 07/25/2024    HGBA1C 7.0 (H) 07/25/2023     No results found for: "CPEPTIDE", "GLUTAMICACID", "ISLETCELLANT"   No results found for: "FRUCTOSAMINE", "GLYCOMARKTM"  Lab Results   Component Value Date    MICALBCREAT 2.5 07/31/2024    MICALBCREAT Unable to calculate 07/29/2022     Diabetes Management Status: Reviewed this office visit  Screening or Prevention Patient's value Goal Complete/Controlled?   Lipid profile : 07/25/2024 Annually Yes   Dilated retinal exam : 08/21/2023 Annually Yes   Foot exam   : 07/25/2023 Annually No     The patient's medications, allergies, past medical, surgical, social and family histories were reviewed and updated as appropriate.  Review of Systems: pertinent positives as per the above HPI, and otherwise negative.    Objective:   BP (!) 136/92   Pulse 85   Wt 101.4 kg (223 lb 9.6 oz)   BMI 27.22 kg/m² "   Body mass index is 27.22 kg/m².  Vital signs reviewed    Physical Exam  Vitals and nursing note reviewed.   Constitutional:       Appearance: Normal appearance. He is well-developed. He is obese. He is not ill-appearing.   Neck:      Thyroid: No thyromegaly.   Pulmonary:      Effort: Pulmonary effort is normal. No respiratory distress.   Musculoskeletal:         General: Normal range of motion.      Cervical back: Normal range of motion.   Neurological:      General: No focal deficit present.      Mental Status: He is alert. Mental status is at baseline.   Psychiatric:         Mood and Affect: Mood normal.         Behavior: Behavior normal.       Labs reviewed:  See results in subjective  Lab Results   Component Value Date    HGBA1C 5.5 11/27/2024     Lab Results   Component Value Date    CHOL 184 07/25/2024    HDL 45 07/25/2024    LDLCALC 95.4 07/25/2024    TRIG 218 (H) 07/25/2024    CHOLHDL 24.5 07/25/2024     Lab Results   Component Value Date     11/27/2024    K 4.6 11/27/2024     11/27/2024    CO2 25 11/27/2024     11/27/2024    BUN 13 11/27/2024    CREATININE 1.0 11/27/2024    CALCIUM 9.4 11/27/2024    PHOS 5.1 (H) 05/25/2016    PROT 7.3 07/25/2024    ALBUMIN 4.3 07/25/2024    BILITOT 0.6 07/25/2024    ALKPHOS 55 07/25/2024    AST 23 07/25/2024    ALT 42 07/25/2024    ANIONGAP 9 11/27/2024    EGFRNORACEVR >60.0 11/27/2024    TSH 2.923 07/25/2024    PTH 52.0 05/19/2020    [id number] 53 07/29/2022     Assessment     1. Type 2 diabetes mellitus with hyperglycemia, without long-term current use of insulin  Basic Metabolic Panel    Hemoglobin A1C    MOUNJARO 7.5 mg/0.5 mL PnIj    Microalbumin/Creatinine Ratio, Urine    Vitamin D      2. Type 2 diabetes mellitus with other specified complication, unspecified whether long term insulin use  metFORMIN (GLUCOPHAGE-XR) 500 MG ER 24hr tablet      3. Essential hypertension        4. BMI 30.0-30.9,adult        5. Hypovitaminosis D          Plan      Type 2 diabetes mellitus with hyperglycemia, without long-term current use of insulin  - Diabetes is AT GOAL, as indicated by the most recent A1C reviewed on 12/4/2024.  - Therapy Goals: Discussed the aim to achieve optimal control without causing hypoglycemia, Targeting an A1C of <7%.  - CGM: Freestyle Latanya data was downloaded, personally interpreted, and reviewed with the patient during this visit. See noted below.  - Diabetic Supplies and Medications: Reviewed to ensure continued refills and compliance.  - Preventive Care: Advised the patient to schedule periodic eye exams and maintain regular foot care monitoring.  - Annual Monitoring: Reviewed the importance of yearly lipid profile (with statin use) and urine protein/creatinine tests.  - hypoglycemia on Freestyle Latanya 3:  Likely sensor issue and not actual hypoglycemic events.  Given patient without any symptoms, I did discuss this with him 12/4/2024    Plan  - Changes:  Continue to benefit from Mounjaro given much better glucose control and weight loss  - INCREASE MOUNJARO TO 7.5 MG once a week injection  - Dietary Modifications: Encouraged portion size control and reduction of carbohydrate intake to better manage diabetes.  - Given good glucose control I advise that he can stop CGM monitoring if too expensive  - Hypoglycemia Management: Discussed symptoms and treatment of hypoglycemia. The patient is informed  - Clear written instructions provided on AVS. Follow-up scheduled within the next 6 months with lab work prior.   - Appointment date and time were provided to the patient today.     Essential hypertension  - Blood pressure review in clinic today  - On  blood pressure medication    BMI 30.0-30.9,adult  - Body mass index is 27.22 kg/m².  - dietary discussion as above  - continue to monitor weight>> encourage patient to continue weight loss  - on Mounjaro    Hypovitaminosis D  - history of vitamin-D deficiency  -continue routine monitoring, lab work  every year         Advised patient to follow up with PCP for routine health maintenance care.   RTC in *4 months    Boston Church M.D.  Endocrinology  [facility]  12/4/2024      Disclaimer: This note has been generated in part with the use of voice-recognition software. There may be typographical errors that have been missed during proof-reading.

## 2024-12-04 NOTE — ASSESSMENT & PLAN NOTE
- Body mass index is 27.22 kg/m².  - dietary discussion as above  - continue to monitor weight>> encourage patient to continue weight loss  - on Mounjaro

## 2024-12-05 DIAGNOSIS — M10.9 GOUT, UNSPECIFIED CAUSE, UNSPECIFIED CHRONICITY, UNSPECIFIED SITE: ICD-10-CM

## 2024-12-18 ENCOUNTER — PATIENT MESSAGE (OUTPATIENT)
Dept: ENDOCRINOLOGY | Facility: CLINIC | Age: 54
End: 2024-12-18
Payer: COMMERCIAL

## 2024-12-18 DIAGNOSIS — E11.65 TYPE 2 DIABETES MELLITUS WITH HYPERGLYCEMIA, WITHOUT LONG-TERM CURRENT USE OF INSULIN: Primary | ICD-10-CM

## 2024-12-19 RX ORDER — TIRZEPATIDE 10 MG/.5ML
10 INJECTION, SOLUTION SUBCUTANEOUS
Qty: 4 PEN | Refills: 6 | Status: SHIPPED | OUTPATIENT
Start: 2024-12-19

## 2024-12-20 RX ORDER — ALLOPURINOL 100 MG/1
100 TABLET ORAL DAILY
Qty: 90 TABLET | Refills: 0 | Status: SHIPPED | OUTPATIENT
Start: 2024-12-20

## 2025-02-20 ENCOUNTER — OFFICE VISIT (OUTPATIENT)
Dept: URGENT CARE | Facility: CLINIC | Age: 55
End: 2025-02-20
Payer: COMMERCIAL

## 2025-02-20 VITALS
WEIGHT: 220 LBS | DIASTOLIC BLOOD PRESSURE: 82 MMHG | HEART RATE: 69 BPM | HEIGHT: 76 IN | RESPIRATION RATE: 18 BRPM | TEMPERATURE: 98 F | BODY MASS INDEX: 26.79 KG/M2 | OXYGEN SATURATION: 98 % | SYSTOLIC BLOOD PRESSURE: 147 MMHG

## 2025-02-20 DIAGNOSIS — M10.9 ACUTE GOUT OF LEFT FOOT, UNSPECIFIED CAUSE: Primary | ICD-10-CM

## 2025-02-20 RX ORDER — COLCHICINE 0.6 MG/1
TABLET ORAL
Qty: 30 TABLET | Refills: 0 | Status: SHIPPED | OUTPATIENT
Start: 2025-02-20

## 2025-02-20 RX ORDER — DEXAMETHASONE SODIUM PHOSPHATE 10 MG/ML
10 INJECTION INTRAMUSCULAR; INTRAVENOUS
Status: COMPLETED | OUTPATIENT
Start: 2025-02-20 | End: 2025-02-20

## 2025-02-20 RX ORDER — INDOMETHACIN 50 MG/1
50 CAPSULE ORAL 3 TIMES DAILY
Qty: 30 CAPSULE | Refills: 0 | Status: SHIPPED | OUTPATIENT
Start: 2025-02-20 | End: 2025-03-02

## 2025-02-20 RX ADMIN — DEXAMETHASONE SODIUM PHOSPHATE 10 MG: 10 INJECTION INTRAMUSCULAR; INTRAVENOUS at 09:02

## 2025-02-20 NOTE — PATIENT INSTRUCTIONS
Acute gout of left foot, unspecified cause    -     dexAMETHasone injection 10 mg  - given in clinic @ 9:30 am      For gout flare:  -     colchicine (COLCRYS) 0.6 mg tablet;   For initial flare:    Colchicine 1.2 mg (two 0.6-mg tablets) orally at the first sign of a flare   followed by 0.6 mg (1 tablet) one hour later; MAX 1.8 mg over 1 hour .     For prophylaxis (start at least 12 hours after initial flare treatment):    0.6 mg orally once or twice daily, MAX 1.2 mg/day      For Pain:  -     indomethacin (INDOCIN) 50 MG capsule; Take 1 capsule (50 mg total) by mouth 3 (three) times daily. for 10 days    You should take an over the counter antacid (zantac, Nexium, Prilosec) to protect your stomach. Eat with this medication.        You received a steroid shot today - this can elevate your blood pressure, elevate your blood sugar, water weight gain, nervous energy, redness to the face and dimpling of the skin where the shot goes in.   Do not use steroids more than 3 times per year.   If you have diabetes, please check you blood sugar frequently.  If you have high blood pressure, please check your blood pressure frequently.     What foods should be limited or avoided?   Foods that are high in purines and those that raise uric acid levels include:  Beer  Gravies and sauces made with meat  Anchovies, sardines, caviar, herring, mussels, tuna, codfish, scallops, trout, and chapincito  Foods high in fat like duran, red meats, fatty poultry, dark meats, skin of poultry, high-fat dairy products  Organ meats like liver, kidney, tripe, and sweetbreads  Wild game like veal, venison, and duck  High fructose corn syrup in foods and drinks  Yeast  Foods that are moderate in purines include:  Oatmeal. Limit to 2/3 cup (65 g) uncooked each day.  Wheat bran and wheat germ. Limit to 1/4 cup (30 g) dry each day.  Meat, poultry, crab, lobsters, oysters, and shrimp are moderate in purines. Limit to 4 to 6 ounces of these foods per  day.  Lunch meats, especially high-fat versions  Asparagus, spinach, cauliflower, and mushrooms. Limit these vegetables to 1/2 cup per day (75 g).  Dried beans, peas, and lentils. Limit these to 1 cup (240 g) cooked each day.  Most kinds of alcohol       Please follow up with your primary care doctor for further treatment if your symptoms do not improve.      If your symptoms worsen in the mean time (numbness to the area, inability to use your extremity, severely worsening pain or swelling, fever, blood in your stool or dark brown stool) please go to the emergency room for further treatment.

## 2025-02-20 NOTE — PROGRESS NOTES
"Subjective:      Patient ID: Parminder Gregorio is a 54 y.o. male.    Vitals:  height is 6' 4" (1.93 m) and weight is 99.8 kg (220 lb). His oral temperature is 98 °F (36.7 °C). His blood pressure is 147/82 (abnormal) and his pulse is 69. His respiration is 18 and oxygen saturation is 98%.     Chief Complaint: Foot Swelling    Patient is a 55 yo male presenting with left foot pain.  Pt reports onset 3 days ago. Patient has been having gout issues recently.  Patient took over the counter pain medication this morning that helped.     Foot Injury   The incident occurred 3 to 5 days ago. The incident occurred at home. There was no injury mechanism. The quality of the pain is described as burning and aching. The pain is at a severity of 10/10. The pain is severe. The pain has been Constant since onset. Pertinent negatives include no numbness or tingling. The symptoms are aggravated by movement, palpation and weight bearing. The treatment provided moderate relief.       Constitution: Negative for chills, fatigue and fever.   Cardiovascular:  Negative for chest pain.   Respiratory:  Negative for chest tightness and shortness of breath.    Musculoskeletal:  Positive for pain (left foot) and joint swelling.   Neurological:  Negative for headaches and numbness.      Objective:     Physical Exam   Constitutional: He is oriented to person, place, and time.   Eyes: EOM are normal.   Cardiovascular: Normal rate and regular rhythm.   Pulmonary/Chest: Effort normal and breath sounds normal. No respiratory distress.   Musculoskeletal:      Left ankle: He exhibits swelling. He exhibits normal range of motion, no ecchymosis, no deformity and no laceration. Tenderness.      Left foot: Normal capillary refill. Tenderness and swelling present.        Feet:    Neurological: He is alert and oriented to person, place, and time.   Skin: Skin is warm and dry. not left ankle  Nursing note and vitals reviewed.      Assessment:     1. Acute gout of " left foot, unspecified cause      GFR >60 and A1c <7% on 11/27/24.    Plan:       Acute gout of left foot, unspecified cause  -     dexAMETHasone injection 10 mg  -     colchicine (COLCRYS) 0.6 mg tablet; For initial flare:  Colchicine 1.2 mg (two 0.6-mg tablets) orally at the first sign of a flare followed by 0.6 mg (1 tablet) one hour later; MAX 1.8 mg over 1 hour . For prophylaxis (start at least 12 hours after initial flare treatment):  0.6 mg orally once or twice daily, MAX 1.2 mg/day  Dispense: 30 tablet; Refill: 0      Patient Instructions   Acute gout of left foot, unspecified cause    -     dexAMETHasone injection 10 mg  - given in clinic @ 9:30 am      For gout flare:  -     colchicine (COLCRYS) 0.6 mg tablet;   For initial flare:    Colchicine 1.2 mg (two 0.6-mg tablets) orally at the first sign of a flare   followed by 0.6 mg (1 tablet) one hour later; MAX 1.8 mg over 1 hour .     For prophylaxis (start at least 12 hours after initial flare treatment):    0.6 mg orally once or twice daily, MAX 1.2 mg/day      For Pain:  -     indomethacin (INDOCIN) 50 MG capsule; Take 1 capsule (50 mg total) by mouth 3 (three) times daily. for 10 days    You should take an over the counter antacid (zantac, Nexium, Prilosec) to protect your stomach. Eat with this medication.        You received a steroid shot today - this can elevate your blood pressure, elevate your blood sugar, water weight gain, nervous energy, redness to the face and dimpling of the skin where the shot goes in.   Do not use steroids more than 3 times per year.   If you have diabetes, please check you blood sugar frequently.  If you have high blood pressure, please check your blood pressure frequently.     What foods should be limited or avoided?   Foods that are high in purines and those that raise uric acid levels include:  Beer  Gravies and sauces made with meat  Anchovies, sardines, caviar, herring, mussels, tuna, codfish, scallops, trout, and  chapincito  Foods high in fat like duran, red meats, fatty poultry, dark meats, skin of poultry, high-fat dairy products  Organ meats like liver, kidney, tripe, and sweetbreads  Wild game like veal, venison, and duck  High fructose corn syrup in foods and drinks  Yeast  Foods that are moderate in purines include:  Oatmeal. Limit to 2/3 cup (65 g) uncooked each day.  Wheat bran and wheat germ. Limit to 1/4 cup (30 g) dry each day.  Meat, poultry, crab, lobsters, oysters, and shrimp are moderate in purines. Limit to 4 to 6 ounces of these foods per day.  Lunch meats, especially high-fat versions  Asparagus, spinach, cauliflower, and mushrooms. Limit these vegetables to 1/2 cup per day (75 g).  Dried beans, peas, and lentils. Limit these to 1 cup (240 g) cooked each day.  Most kinds of alcohol       Please follow up with your primary care doctor for further treatment if your symptoms do not improve.      If your symptoms worsen in the mean time (numbness to the area, inability to use your extremity, severely worsening pain or swelling, fever, blood in your stool or dark brown stool) please go to the emergency room for further treatment.

## 2025-08-02 DIAGNOSIS — E11.65 TYPE 2 DIABETES MELLITUS WITH HYPERGLYCEMIA, WITHOUT LONG-TERM CURRENT USE OF INSULIN: ICD-10-CM

## 2025-08-05 RX ORDER — BLOOD-GLUCOSE SENSOR
EACH MISCELLANEOUS
Qty: 2 EACH | Refills: 3 | Status: SHIPPED | OUTPATIENT
Start: 2025-08-05

## 2025-08-07 RX ORDER — ROSUVASTATIN CALCIUM 10 MG/1
10 TABLET, COATED ORAL NIGHTLY
COMMUNITY
Start: 2025-05-03 | End: 2025-08-08 | Stop reason: SDUPTHER

## 2025-08-07 NOTE — PROGRESS NOTES
Health Maintenance Due   Topic     TETANUS VACCINE  Patient declined    Pneumococcal Vaccines (Age 50+) (1 of 2 - PCV) Patient declined    Low Dose Statin  Consult PCP    Shingles Vaccine (1 of 2) Patient declined    Foot Exam  Consult PCP    Diabetic Eye Exam  Pt states he has an eye doctor [facility] COVID-19 Vaccine (1 - 2024-25 season) Patient declined    Hemoglobin A1c  Consult PCP    Lipid Panel  Consult PCP    Diabetes Urine Screening  Consult PCP    Colorectal Cancer Screening  Consult PCP

## 2025-08-08 ENCOUNTER — LAB VISIT (OUTPATIENT)
Dept: LAB | Facility: HOSPITAL | Age: 55
End: 2025-08-08
Attending: HOSPITALIST
Payer: COMMERCIAL

## 2025-08-08 ENCOUNTER — CLINICAL SUPPORT (OUTPATIENT)
Dept: FAMILY MEDICINE | Facility: CLINIC | Age: 55
End: 2025-08-08
Attending: INTERNAL MEDICINE
Payer: COMMERCIAL

## 2025-08-08 ENCOUNTER — OFFICE VISIT (OUTPATIENT)
Dept: FAMILY MEDICINE | Facility: CLINIC | Age: 55
End: 2025-08-08
Payer: COMMERCIAL

## 2025-08-08 VITALS
HEIGHT: 76 IN | WEIGHT: 217.38 LBS | TEMPERATURE: 98 F | OXYGEN SATURATION: 99 % | BODY MASS INDEX: 26.47 KG/M2 | DIASTOLIC BLOOD PRESSURE: 86 MMHG | HEART RATE: 80 BPM | RESPIRATION RATE: 18 BRPM | SYSTOLIC BLOOD PRESSURE: 138 MMHG

## 2025-08-08 DIAGNOSIS — Z00.00 ANNUAL PHYSICAL EXAM: Primary | ICD-10-CM

## 2025-08-08 DIAGNOSIS — Z12.5 PROSTATE CANCER SCREENING: ICD-10-CM

## 2025-08-08 DIAGNOSIS — Z00.00 ANNUAL PHYSICAL EXAM: ICD-10-CM

## 2025-08-08 DIAGNOSIS — Z91.89 CANDIDATE FOR STATIN THERAPY DUE TO RISK OF FUTURE CARDIOVASCULAR EVENT: ICD-10-CM

## 2025-08-08 DIAGNOSIS — E11.69 TYPE 2 DIABETES MELLITUS WITH OTHER SPECIFIED COMPLICATION, UNSPECIFIED WHETHER LONG TERM INSULIN USE: ICD-10-CM

## 2025-08-08 DIAGNOSIS — E55.9 HYPOVITAMINOSIS D: ICD-10-CM

## 2025-08-08 DIAGNOSIS — E11.65 TYPE 2 DIABETES MELLITUS WITH HYPERGLYCEMIA, WITHOUT LONG-TERM CURRENT USE OF INSULIN: ICD-10-CM

## 2025-08-08 DIAGNOSIS — I10 HYPERTENSION, UNSPECIFIED TYPE: ICD-10-CM

## 2025-08-08 DIAGNOSIS — M10.9 GOUT, UNSPECIFIED CAUSE, UNSPECIFIED CHRONICITY, UNSPECIFIED SITE: ICD-10-CM

## 2025-08-08 DIAGNOSIS — Z12.11 COLON CANCER SCREENING: ICD-10-CM

## 2025-08-08 PROBLEM — M70.21 OLECRANON BURSITIS OF RIGHT ELBOW: Status: RESOLVED | Noted: 2020-07-22 | Resolved: 2025-08-08

## 2025-08-08 PROBLEM — M1A.09X1 IDIOPATHIC CHRONIC GOUT OF MULTIPLE SITES WITH TOPHUS: Status: RESOLVED | Noted: 2017-07-24 | Resolved: 2025-08-08

## 2025-08-08 PROBLEM — E11.9 CONTROLLED TYPE 2 DIABETES MELLITUS WITHOUT COMPLICATION, WITHOUT LONG-TERM CURRENT USE OF INSULIN: Status: RESOLVED | Noted: 2017-07-25 | Resolved: 2025-08-08

## 2025-08-08 LAB
25(OH)D3+25(OH)D2 SERPL-MCNC: 31 NG/ML (ref 30–96)
ABSOLUTE EOSINOPHIL (OHS): 0.14 K/UL
ABSOLUTE MONOCYTE (OHS): 0.39 K/UL (ref 0.3–1)
ABSOLUTE NEUTROPHIL COUNT (OHS): 3.89 K/UL (ref 1.8–7.7)
ALBUMIN SERPL BCP-MCNC: 4.6 G/DL (ref 3.5–5.2)
ALBUMIN/CREAT UR: 3.1 UG/MG
ALP SERPL-CCNC: 57 UNIT/L (ref 40–150)
ALT SERPL W/O P-5'-P-CCNC: 45 UNIT/L (ref 0–55)
ANION GAP (OHS): 10 MMOL/L (ref 8–16)
AST SERPL-CCNC: 34 UNIT/L (ref 0–50)
BASOPHILS # BLD AUTO: 0.01 K/UL
BASOPHILS NFR BLD AUTO: 0.2 %
BILIRUB SERPL-MCNC: 1.2 MG/DL (ref 0.1–1)
BUN SERPL-MCNC: 18 MG/DL (ref 6–20)
CALCIUM SERPL-MCNC: 9.7 MG/DL (ref 8.7–10.5)
CHLORIDE SERPL-SCNC: 103 MMOL/L (ref 95–110)
CHOLEST SERPL-MCNC: 129 MG/DL (ref 120–199)
CHOLEST/HDLC SERPL: 2.2 {RATIO} (ref 2–5)
CO2 SERPL-SCNC: 26 MMOL/L (ref 23–29)
CREAT SERPL-MCNC: 1.2 MG/DL (ref 0.5–1.4)
CREAT UR-MCNC: 163 MG/DL (ref 23–375)
EAG (OHS): 114 MG/DL (ref 68–131)
ERYTHROCYTE [DISTWIDTH] IN BLOOD BY AUTOMATED COUNT: 13.9 % (ref 11.5–14.5)
GFR SERPLBLD CREATININE-BSD FMLA CKD-EPI: >60 ML/MIN/1.73/M2
GLUCOSE SERPL-MCNC: 96 MG/DL (ref 70–110)
HBA1C MFR BLD: 5.6 % (ref 4–5.6)
HCT VFR BLD AUTO: 41.4 % (ref 40–54)
HDLC SERPL-MCNC: 60 MG/DL (ref 40–75)
HDLC SERPL: 46.5 % (ref 20–50)
HGB BLD-MCNC: 13.3 GM/DL (ref 14–18)
IMM GRANULOCYTES # BLD AUTO: 0.01 K/UL (ref 0–0.04)
IMM GRANULOCYTES NFR BLD AUTO: 0.2 % (ref 0–0.5)
LDLC SERPL CALC-MCNC: 48 MG/DL (ref 63–159)
LYMPHOCYTES # BLD AUTO: 1.38 K/UL (ref 1–4.8)
MCH RBC QN AUTO: 26.7 PG (ref 27–31)
MCHC RBC AUTO-ENTMCNC: 32.1 G/DL (ref 32–36)
MCV RBC AUTO: 83 FL (ref 82–98)
MICROALBUMIN UR-MCNC: 5 UG/ML (ref ?–5000)
NONHDLC SERPL-MCNC: 69 MG/DL
NUCLEATED RBC (/100WBC) (OHS): 0 /100 WBC
PLATELET # BLD AUTO: 130 K/UL (ref 150–450)
PMV BLD AUTO: 11.1 FL (ref 9.2–12.9)
POTASSIUM SERPL-SCNC: 4.7 MMOL/L (ref 3.5–5.1)
PROT SERPL-MCNC: 8.1 GM/DL (ref 6–8.4)
PSA SERPL-MCNC: 0.85 NG/ML
RBC # BLD AUTO: 4.98 M/UL (ref 4.6–6.2)
RELATIVE EOSINOPHIL (OHS): 2.4 %
RELATIVE LYMPHOCYTE (OHS): 23.7 % (ref 18–48)
RELATIVE MONOCYTE (OHS): 6.7 % (ref 4–15)
RELATIVE NEUTROPHIL (OHS): 66.8 % (ref 38–73)
SODIUM SERPL-SCNC: 139 MMOL/L (ref 136–145)
TRIGL SERPL-MCNC: 105 MG/DL (ref 30–150)
URATE SERPL-MCNC: 8.9 MG/DL (ref 3.4–7)
WBC # BLD AUTO: 5.82 K/UL (ref 3.9–12.7)

## 2025-08-08 PROCEDURE — 99999 PR PBB SHADOW E&M-EST. PATIENT-LVL III: CPT | Mod: PBBFAC,,, | Performed by: INTERNAL MEDICINE

## 2025-08-08 PROCEDURE — 84550 ASSAY OF BLOOD/URIC ACID: CPT

## 2025-08-08 PROCEDURE — 36415 COLL VENOUS BLD VENIPUNCTURE: CPT | Mod: PO

## 2025-08-08 PROCEDURE — 82043 UR ALBUMIN QUANTITATIVE: CPT | Performed by: INTERNAL MEDICINE

## 2025-08-08 PROCEDURE — 80053 COMPREHEN METABOLIC PANEL: CPT

## 2025-08-08 PROCEDURE — 82652 VIT D 1 25-DIHYDROXY: CPT

## 2025-08-08 PROCEDURE — 84153 ASSAY OF PSA TOTAL: CPT

## 2025-08-08 PROCEDURE — 85025 COMPLETE CBC W/AUTO DIFF WBC: CPT

## 2025-08-08 PROCEDURE — 83036 HEMOGLOBIN GLYCOSYLATED A1C: CPT

## 2025-08-08 PROCEDURE — 82306 VITAMIN D 25 HYDROXY: CPT

## 2025-08-08 PROCEDURE — 82465 ASSAY BLD/SERUM CHOLESTEROL: CPT

## 2025-08-08 RX ORDER — ROSUVASTATIN CALCIUM 10 MG/1
10 TABLET, COATED ORAL NIGHTLY
Qty: 90 TABLET | Refills: 3 | Status: SHIPPED | OUTPATIENT
Start: 2025-08-08 | End: 2026-08-08

## 2025-08-08 RX ORDER — COLCHICINE 0.6 MG/1
TABLET ORAL
Qty: 30 TABLET | Refills: 0 | Status: SHIPPED | OUTPATIENT
Start: 2025-08-08

## 2025-08-08 RX ORDER — ALLOPURINOL 100 MG/1
100 TABLET ORAL DAILY
Qty: 90 TABLET | Refills: 3 | Status: SHIPPED | OUTPATIENT
Start: 2025-08-08 | End: 2026-08-08

## 2025-08-08 RX ORDER — LISINOPRIL 10 MG/1
10 TABLET ORAL DAILY
Qty: 90 TABLET | Refills: 3 | Status: SHIPPED | OUTPATIENT
Start: 2025-08-08 | End: 2026-08-08

## 2025-08-08 NOTE — ASSESSMENT & PLAN NOTE
- Assessed patient's understanding of gout management medications.  - Prescribed allopurinol daily for long-term management and colchicine as needed for acute flares.  - Ordered uric acid level as part of labs to ensure it remains below 7.

## 2025-08-08 NOTE — ASSESSMENT & PLAN NOTE
- Prescribed rosuvastatin for cholesterol management.  - Ordered fasting labs to monitor lipid levels.

## 2025-08-08 NOTE — ASSESSMENT & PLAN NOTE
Physical exam completed, with results as mentioned above  Discussed HCM with patient    - annual labs ordered    -  Cologuard ordered  - discussed benefits and risks of screening for prostate cancer. PSA ordered   - advised patient to follow up with ophthalmologist and dentist every year  - reviewed medical, surgical, family and social history

## 2025-08-08 NOTE — ASSESSMENT & PLAN NOTE
- Noted improvement in A1C level since November last year, likely due to combination of Mounjaro 10 mg and Metformin 500 mg daily.  - Current blood sugar is 93, with patient checking levels sparingly as advised and expressing pride in blood sugar control.  - Ordered fasting labs including uric acid level, diabetic eye exam using in-office camera, and urinalysis to monitor diabetes management.

## 2025-08-08 NOTE — PROGRESS NOTES
Parminder Gregorio is a 54 y.o. male here for a diabetic eye screening with non-dilated fundus photos per .    Patient cooperative?: Yes  Small pupils?: Yes  Last eye exam:     For exam results, see Encounter Report.

## 2025-08-11 LAB — W VITAMIN D, 1, 25-DIHYDROXY: 43 PG/ML

## (undated) DEVICE — SEE MEDLINE ITEM 157117

## (undated) DEVICE — Device

## (undated) DEVICE — SEE MEDLINE ITEM 146345

## (undated) DEVICE — UNDERGLOVES BIOGEL PI SIZE 8

## (undated) DEVICE — SUPPORT ULNA NERVE PROTECTOR

## (undated) DEVICE — GAUZE SPONGE 4X4 12PLY

## (undated) DEVICE — GLOVE SURGICAL LATEX SZ 7

## (undated) DEVICE — CANISTER SUCTION 2 LTR

## (undated) DEVICE — UNDERGLOVES BIOGEL PI SIZE 7.5

## (undated) DEVICE — UNDERGLOVE BIOGEL PI SZ 6.5 LF

## (undated) DEVICE — SEE MEDLINE ITEM 146308

## (undated) DEVICE — GLOVE SURG BIOGEL LATEX SZ 7.5

## (undated) DEVICE — PAD PREP 50/CA

## (undated) DEVICE — SUT ETHILON 3/0 18IN PS-1

## (undated) DEVICE — APPLICATOR CHLORAPREP ORN 26ML

## (undated) DEVICE — SEE MEDLINE ITEM 152522

## (undated) DEVICE — PAD ABD 8X10 STERILE

## (undated) DEVICE — COVER OVERHEAD SURG LT BLUE

## (undated) DEVICE — PACK ARTHROSCOPY W/ISO BAC

## (undated) DEVICE — PAD CAST SPECIALIST STRL 4

## (undated) DEVICE — GLOVE BIOGEL PI MICRO SZ 6.5

## (undated) DEVICE — SUT VICRYL 3-0 27 SH

## (undated) DEVICE — ELECTRODE REM PLYHSV RETURN 9

## (undated) DEVICE — SLING ORTHOPEDIC LARGE